# Patient Record
Sex: MALE | Race: WHITE | NOT HISPANIC OR LATINO | URBAN - METROPOLITAN AREA
[De-identification: names, ages, dates, MRNs, and addresses within clinical notes are randomized per-mention and may not be internally consistent; named-entity substitution may affect disease eponyms.]

---

## 2020-06-29 PROBLEM — M54.2 NECK PAIN: Status: ACTIVE | Noted: 2020-06-29

## 2020-11-23 ENCOUNTER — HISTORICAL (OUTPATIENT)
Dept: OCCUPATIONAL THERAPY | Facility: HOSPITAL | Age: 60
End: 2020-11-23

## 2020-12-01 ENCOUNTER — HISTORICAL (OUTPATIENT)
Dept: OCCUPATIONAL THERAPY | Facility: HOSPITAL | Age: 60
End: 2020-12-01

## 2020-12-08 ENCOUNTER — HISTORICAL (OUTPATIENT)
Dept: OCCUPATIONAL THERAPY | Facility: HOSPITAL | Age: 60
End: 2020-12-08

## 2020-12-10 ENCOUNTER — HISTORICAL (OUTPATIENT)
Dept: OCCUPATIONAL THERAPY | Facility: HOSPITAL | Age: 60
End: 2020-12-10

## 2020-12-17 ENCOUNTER — HISTORICAL (OUTPATIENT)
Dept: OCCUPATIONAL THERAPY | Facility: HOSPITAL | Age: 60
End: 2020-12-17

## 2020-12-21 ENCOUNTER — HISTORICAL (OUTPATIENT)
Dept: OCCUPATIONAL THERAPY | Facility: HOSPITAL | Age: 60
End: 2020-12-21

## 2020-12-29 ENCOUNTER — HISTORICAL (OUTPATIENT)
Dept: OCCUPATIONAL THERAPY | Facility: HOSPITAL | Age: 60
End: 2020-12-29

## 2021-01-07 ENCOUNTER — HISTORICAL (OUTPATIENT)
Dept: OCCUPATIONAL THERAPY | Facility: HOSPITAL | Age: 61
End: 2021-01-07

## 2021-01-14 ENCOUNTER — HISTORICAL (OUTPATIENT)
Dept: OCCUPATIONAL THERAPY | Facility: HOSPITAL | Age: 61
End: 2021-01-14

## 2021-01-19 ENCOUNTER — HISTORICAL (OUTPATIENT)
Dept: OCCUPATIONAL THERAPY | Facility: HOSPITAL | Age: 61
End: 2021-01-19

## 2021-01-21 ENCOUNTER — HISTORICAL (OUTPATIENT)
Dept: OCCUPATIONAL THERAPY | Facility: HOSPITAL | Age: 61
End: 2021-01-21

## 2021-01-26 ENCOUNTER — HISTORICAL (OUTPATIENT)
Dept: OCCUPATIONAL THERAPY | Facility: HOSPITAL | Age: 61
End: 2021-01-26

## 2021-01-28 ENCOUNTER — HISTORICAL (OUTPATIENT)
Dept: OCCUPATIONAL THERAPY | Facility: HOSPITAL | Age: 61
End: 2021-01-28

## 2022-10-04 ENCOUNTER — ANESTHESIA EVENT (OUTPATIENT)
Dept: ENDOSCOPY | Facility: HOSPITAL | Age: 62
End: 2022-10-04
Payer: MEDICARE

## 2022-10-05 ENCOUNTER — ANESTHESIA (OUTPATIENT)
Dept: ENDOSCOPY | Facility: HOSPITAL | Age: 62
End: 2022-10-05
Payer: MEDICARE

## 2022-10-05 ENCOUNTER — HOSPITAL ENCOUNTER (OUTPATIENT)
Facility: HOSPITAL | Age: 62
Discharge: HOME | End: 2022-10-05
Attending: INTERNAL MEDICINE | Admitting: INTERNAL MEDICINE
Payer: MEDICARE

## 2022-10-05 LAB — POCT GLUCOSE: 89 MG/DL (ref 70–110)

## 2022-10-05 PROCEDURE — 25000003 PHARM REV CODE 250: Performed by: INTERNAL MEDICINE

## 2022-10-05 PROCEDURE — 25000003 PHARM REV CODE 250: Performed by: ANESTHESIOLOGY

## 2022-10-05 PROCEDURE — 94640 AIRWAY INHALATION TREATMENT: CPT

## 2022-10-05 PROCEDURE — 63600175 PHARM REV CODE 636 W HCPCS: Performed by: ANESTHESIOLOGY

## 2022-10-05 PROCEDURE — 99900035 HC TECH TIME PER 15 MIN (STAT)

## 2022-10-05 PROCEDURE — 31652 BRONCH EBUS SAMPLNG 1/2 NODE: CPT | Performed by: INTERNAL MEDICINE

## 2022-10-05 PROCEDURE — 37000008 HC ANESTHESIA 1ST 15 MINUTES: Performed by: INTERNAL MEDICINE

## 2022-10-05 PROCEDURE — 37000009 HC ANESTHESIA EA ADD 15 MINS: Performed by: INTERNAL MEDICINE

## 2022-10-05 PROCEDURE — 88161 CYTOPATH SMEAR OTHER SOURCE: CPT | Performed by: INTERNAL MEDICINE

## 2022-10-05 PROCEDURE — 25000242 PHARM REV CODE 250 ALT 637 W/ HCPCS: Performed by: ANESTHESIOLOGY

## 2022-10-05 PROCEDURE — 27201423 OPTIME MED/SURG SUP & DEVICES STERILE SUPPLY: Performed by: INTERNAL MEDICINE

## 2022-10-05 RX ORDER — IPRATROPIUM BROMIDE AND ALBUTEROL SULFATE 2.5; .5 MG/3ML; MG/3ML
3 SOLUTION RESPIRATORY (INHALATION) ONCE
Status: COMPLETED | OUTPATIENT
Start: 2022-10-05 | End: 2022-10-05

## 2022-10-05 RX ORDER — SODIUM CHLORIDE 0.9 % (FLUSH) 0.9 %
10 SYRINGE (ML) INJECTION
Status: CANCELLED | OUTPATIENT
Start: 2022-10-05

## 2022-10-05 RX ORDER — ONDANSETRON 2 MG/ML
INJECTION INTRAMUSCULAR; INTRAVENOUS
Status: DISCONTINUED | OUTPATIENT
Start: 2022-10-05 | End: 2022-10-05

## 2022-10-05 RX ORDER — LIDOCAINE HYDROCHLORIDE 40 MG/ML
4 SOLUTION TOPICAL
Status: DISCONTINUED | OUTPATIENT
Start: 2022-10-05 | End: 2022-10-05 | Stop reason: HOSPADM

## 2022-10-05 RX ORDER — FENTANYL CITRATE 50 UG/ML
INJECTION, SOLUTION INTRAMUSCULAR; INTRAVENOUS
Status: DISCONTINUED | OUTPATIENT
Start: 2022-10-05 | End: 2022-10-05

## 2022-10-05 RX ORDER — IPRATROPIUM BROMIDE AND ALBUTEROL SULFATE 2.5; .5 MG/3ML; MG/3ML
3 SOLUTION RESPIRATORY (INHALATION) EVERY 6 HOURS PRN
COMMUNITY

## 2022-10-05 RX ORDER — LIDOCAINE HYDROCHLORIDE 20 MG/ML
15 SOLUTION OROPHARYNGEAL ONCE
Status: COMPLETED | OUTPATIENT
Start: 2022-10-05 | End: 2022-10-05

## 2022-10-05 RX ORDER — FAMOTIDINE 10 MG/ML
INJECTION INTRAVENOUS
Status: DISCONTINUED
Start: 2022-10-05 | End: 2022-10-05 | Stop reason: HOSPADM

## 2022-10-05 RX ORDER — HYDROCODONE BITARTRATE AND ACETAMINOPHEN 5; 325 MG/1; MG/1
1 TABLET ORAL
Status: CANCELLED | OUTPATIENT
Start: 2022-10-05

## 2022-10-05 RX ORDER — LIDOCAINE HYDROCHLORIDE 10 MG/ML
1 INJECTION, SOLUTION EPIDURAL; INFILTRATION; INTRACAUDAL; PERINEURAL ONCE
Status: DISCONTINUED | OUTPATIENT
Start: 2022-10-05 | End: 2022-10-05 | Stop reason: HOSPADM

## 2022-10-05 RX ORDER — GLYCOPYRROLATE 0.2 MG/ML
INJECTION INTRAMUSCULAR; INTRAVENOUS
Status: DISCONTINUED | OUTPATIENT
Start: 2022-10-05 | End: 2022-10-05

## 2022-10-05 RX ORDER — PROPOFOL 10 MG/ML
VIAL (ML) INTRAVENOUS CONTINUOUS PRN
Status: DISCONTINUED | OUTPATIENT
Start: 2022-10-05 | End: 2022-10-05

## 2022-10-05 RX ORDER — FENTANYL CITRATE 50 UG/ML
25 INJECTION, SOLUTION INTRAMUSCULAR; INTRAVENOUS EVERY 5 MIN PRN
Status: CANCELLED | OUTPATIENT
Start: 2022-10-05

## 2022-10-05 RX ORDER — LIDOCAINE HYDROCHLORIDE 20 MG/ML
15 SOLUTION OROPHARYNGEAL ONCE
Status: DISCONTINUED | OUTPATIENT
Start: 2022-10-05 | End: 2022-10-05

## 2022-10-05 RX ORDER — FAMOTIDINE 10 MG/ML
20 INJECTION INTRAVENOUS ONCE
Status: COMPLETED | OUTPATIENT
Start: 2022-10-05 | End: 2022-10-05

## 2022-10-05 RX ORDER — DIPHENHYDRAMINE HYDROCHLORIDE 50 MG/ML
12.5 INJECTION INTRAMUSCULAR; INTRAVENOUS ONCE
Status: COMPLETED | OUTPATIENT
Start: 2022-10-05 | End: 2022-10-05

## 2022-10-05 RX ORDER — PROPOFOL 10 MG/ML
VIAL (ML) INTRAVENOUS
Status: DISCONTINUED | OUTPATIENT
Start: 2022-10-05 | End: 2022-10-05

## 2022-10-05 RX ORDER — DEXAMETHASONE SODIUM PHOSPHATE 4 MG/ML
INJECTION, SOLUTION INTRA-ARTICULAR; INTRALESIONAL; INTRAMUSCULAR; INTRAVENOUS; SOFT TISSUE
Status: DISCONTINUED | OUTPATIENT
Start: 2022-10-05 | End: 2022-10-05

## 2022-10-05 RX ADMIN — PROPOFOL 150 MG: 10 INJECTION, EMULSION INTRAVENOUS at 11:10

## 2022-10-05 RX ADMIN — DEXAMETHASONE SODIUM PHOSPHATE 4 MG: 4 INJECTION, SOLUTION INTRA-ARTICULAR; INTRALESIONAL; INTRAMUSCULAR; INTRAVENOUS; SOFT TISSUE at 11:10

## 2022-10-05 RX ADMIN — DIPHENHYDRAMINE HYDROCHLORIDE 12.5 MG: 50 INJECTION INTRAMUSCULAR; INTRAVENOUS at 11:10

## 2022-10-05 RX ADMIN — SODIUM CHLORIDE, SODIUM GLUCONATE, SODIUM ACETATE, POTASSIUM CHLORIDE AND MAGNESIUM CHLORIDE: 526; 502; 368; 37; 30 INJECTION, SOLUTION INTRAVENOUS at 11:10

## 2022-10-05 RX ADMIN — FENTANYL CITRATE 50 MCG: 50 INJECTION, SOLUTION INTRAMUSCULAR; INTRAVENOUS at 11:10

## 2022-10-05 RX ADMIN — LIDOCAINE HYDROCHLORIDE 15 ML: 20 SOLUTION ORAL; TOPICAL at 11:10

## 2022-10-05 RX ADMIN — LIDOCAINE HYDROCHLORIDE 4 ML: 40 SOLUTION TOPICAL at 11:10

## 2022-10-05 RX ADMIN — Medication 100 MCG/KG/MIN: at 11:10

## 2022-10-05 RX ADMIN — ONDANSETRON 4 MG: 2 INJECTION INTRAMUSCULAR; INTRAVENOUS at 11:10

## 2022-10-05 RX ADMIN — IPRATROPIUM BROMIDE AND ALBUTEROL SULFATE 3 ML: 2.5; .5 SOLUTION RESPIRATORY (INHALATION) at 11:10

## 2022-10-05 RX ADMIN — GLYCOPYRROLATE 0.2 MG: 0.2 INJECTION INTRAMUSCULAR; INTRAVENOUS at 11:10

## 2022-10-05 RX ADMIN — FAMOTIDINE 20 MG: 10 INJECTION, SOLUTION INTRAVENOUS at 10:10

## 2022-10-05 NOTE — TRANSFER OF CARE
"Anesthesia Transfer of Care Note    Patient: Benitez Garcia    Procedure(s) Performed: Procedure(s) (LRB):  ENDOBRONCHIAL ULTRASOUND (EBUS) (N/A)    Patient location: OPS    Anesthesia Type: general    Transport from OR: Transported from OR on room air with adequate spontaneous ventilation    Post pain: adequate analgesia    Post assessment: no apparent anesthetic complications and tolerated procedure well    Post vital signs: stable    Level of consciousness: awake, alert and oriented    Nausea/Vomiting: no nausea/vomiting    Complications: none    Transfer of care protocol was followed      Last vitals:   Visit Vitals  /76   Pulse 75   Temp 36 °C (96.8 °F)   Resp 16   Ht 5' 6" (1.676 m)   Wt 108.4 kg (239 lb)   SpO2 96%   BMI 38.58 kg/m²     "

## 2022-10-05 NOTE — H&P
HPI  The patient is a 62-year-old male with a past medical history of hyperlipidemia, diabetes mellitus, GREGORIO on CPAP, GERD, tobacco abuse who presents to the clinic for referral for mediastinal adenopathy patient initially had CT chest per outside records at at [facility] for lung cancer screening purposes on May 17, 2022 with comparison to prior CT chest in January of 2022 demonstrating borderline bilateral axillary lymphadenopathy with progressive mediastinal adenopathy with an azygous node measuring 3.2 cm previously seen at 2.7 cm, paratracheal node measuring 3.1 cm compared to 2.3 cm with increased prominence of hilar nodes with continued centrilobular and paraseptal emphysema with some interstitial fibrosis with multifocal nodular appearing pleural thickening with increase in the size of a right upper lobe nodule now measuring 5 mm compared to 3 mm with numerous additional bilateral pulmonary nodules including nodules in the right upper lobe and right middle lobe as well as the left upper and lower lobes.  A PET-CT was recommended which was performed on 05/30 with similar adenopathy seen with a right paratracheal lymph node measuring 3.4 x 1.9 cm previously 2.9 x 1.8 cm with SUV of 4.7 with another right paratracheal lymph node measuring 1.9 x 1.9 cm previously 1.6 x 1.5 cm with SUV uptake of 4.3.  AP window lymph node measuring 1.7 x 1.5 cm was previously 1.8 x 1.2 cm with SUV uptake of 2.5. An azygoesophageal lymph node measuring 2.1 x 1.5 cm previously measured 2.8 x 1.2 cm with SUV uptake of 3.1.  The right axillary lymph node measured 17 x 10 mm with peak SUV uptake of 2.3 with left axillary lymph node measuring 1.6 x 1.4 cm previously 1.6 x 1.4 with unchanged SUV of 2.3.  The patient was referred to us for evaluation of this mediastinal adenopathy.     Patient continues to smoke now down to 1/2 pack per day but at the most was smoking 1.5 packs per day since the age of 15.  He denies any alcohol  usage or illicit drug use.  Denies any chewing tobacco or usage of electronic cigarettes.  The patient has been a  for roughly 35 years and states that he has exposure to multiple particulate substances.     On further questioning the patient states that over year ago he has had biopsies of his adenopathy with findings of probable necrosis without positive pathologic findings?               Past Medical History:   Diagnosis Date    COPD (chronic obstructive pulmonary disease)      Diabetes mellitus                    Past Surgical History:   Procedure Laterality Date    FRACTURE SURGERY        fusion left wrist        plate left ankle        SPINE SURGERY                      Family History   Problem Relation Age of Onset    Hearing loss Mother      Cancer Brother              Social History               Socioeconomic History    Marital status:    Tobacco Use    Smoking status: Every Day       Packs/day: 0.25       Years: 48.00       Pack years: 12.00       Types: Cigarettes    Smokeless tobacco: Never   Substance and Sexual Activity    Alcohol use: Not Currently    Drug use: Not Currently                     Review of patient's allergies indicates:   Allergen Reactions    Sulfamethoxazole-trimethoprim Hives       And itching    Darvocet a500 [propoxyphene n-acetaminophen]      Darvon [propoxyphene]      Lisinopril Other (See Comments)       Patient reports BP dropped to 84/42    Propoxyphene-asa-caffeine Other (See Comments)       Patient does not remember reaction    Tylox [oxycodone-acetaminophen]                     Current Outpatient Medications on File Prior to Visit   Medication Sig Dispense Refill    albuterol (PROVENTIL/VENTOLIN HFA) 90 mcg/actuation inhaler INHALE 2 PUFFS BY MOUTH EVERY 6 HOURS AS NEEDED FOR SHORTNESS OF BREATH        amitriptyline (ELAVIL) 25 MG tablet Take 25 mg by mouth every evening.        baclofen (LIORESAL) 20 MG tablet Take 20 mg by mouth 2 (two) times daily.         "DULoxetine (CYMBALTA) 60 MG capsule          HYDROcodone-acetaminophen (NORCO)  mg per tablet Take 1 tablet by mouth every 6 to 8 hours as needed.        ibuprofen (ADVIL,MOTRIN) 800 MG tablet          omeprazole (PRILOSEC) 40 MG capsule Take 40 mg by mouth once daily.        metFORMIN (GLUCOPHAGE-XR) 500 MG XR 24hr tablet          pravastatin (PRAVACHOL) 40 MG tablet            No current facility-administered medications on file prior to visit.            Review of Systems   Constitutional:  Negative for chills, diaphoresis, fever, malaise/fatigue and weight loss.   HENT:  Negative for congestion, ear discharge, ear pain, hearing loss, nosebleeds and sore throat.    Eyes:  Negative for blurred vision, double vision, pain, discharge and redness.   Respiratory:  Negative for cough, hemoptysis, sputum production, shortness of breath, wheezing and stridor.    Cardiovascular:  Negative for chest pain, palpitations, orthopnea, claudication, leg swelling and PND.   Gastrointestinal:  Negative for abdominal pain, blood in stool, constipation, diarrhea, heartburn, melena, nausea and vomiting.   Genitourinary:  Negative for dysuria and hematuria.   Musculoskeletal:  Negative for back pain, joint pain, myalgias and neck pain.   Skin:  Negative for itching and rash.   Neurological:  Negative for dizziness, focal weakness, weakness and headaches.   Endo/Heme/Allergies:  Does not bruise/bleed easily.         There were no vitals filed for this visit.    Vitals:    10/05/22 0959   Weight: 108.4 kg (239 lb)   Height: 5' 6" (1.676 m)              Physical Exam  Vitals and nursing note reviewed.   Constitutional:       General: He is not in acute distress.     Appearance: Normal appearance. He is obese. He is not ill-appearing or toxic-appearing.   HENT:      Head: Normocephalic and atraumatic.      Right Ear: External ear normal.      Left Ear: External ear normal.      Nose: Nose normal.      Mouth/Throat:      Mouth: " Mucous membranes are moist.      Pharynx: Oropharynx is clear. No oropharyngeal exudate or posterior oropharyngeal erythema.      Comments: Poor dentition   Eyes:      General: No scleral icterus.     Extraocular Movements: Extraocular movements intact.      Conjunctiva/sclera: Conjunctivae normal.      Pupils: Pupils are equal, round, and reactive to light.   Neck:      Vascular: No carotid bruit.   Cardiovascular:      Rate and Rhythm: Normal rate and regular rhythm.      Pulses: Normal pulses.      Heart sounds: Normal heart sounds. No murmur heard.    No friction rub. No gallop.   Pulmonary:      Effort: Pulmonary effort is normal. No respiratory distress.      Breath sounds: Normal breath sounds. No wheezing, rhonchi or rales.   Abdominal:      General: Abdomen is flat. Bowel sounds are normal. There is no distension.      Palpations: Abdomen is soft.      Tenderness: There is no abdominal tenderness. There is no guarding or rebound.   Genitourinary:     Comments: deferred  Musculoskeletal:         General: No swelling or deformity. Normal range of motion.      Cervical back: Normal range of motion and neck supple. No rigidity or tenderness.   Lymphadenopathy:      Cervical: No cervical adenopathy.   Skin:     General: Skin is warm and dry.      Capillary Refill: Capillary refill takes less than 2 seconds.      Coloration: Skin is not jaundiced.      Findings: No bruising, lesion or rash.   Neurological:      General: No focal deficit present.      Mental Status: He is alert.      Sensory: No sensory deficit.      Motor: No weakness.   Psychiatric:         Mood and Affect: Mood normal.            Assessment/Plan     Problem List Items Addressed This Visit    None  Visit Diagnoses         Mediastinal adenopathy    -  Primary     Tobacco abuse                 -plan for endobronchial ultrasound today 10/5     Chino David MD  Pulmonology - [facility]

## 2022-10-05 NOTE — ANESTHESIA PREPROCEDURE EVALUATION
10/05/2022  Benitez Garcia is a 62 y.o., male.      Pre-op Assessment    I have reviewed the Patient Summary Reports.     I have reviewed the Nursing Notes. I have reviewed the NPO Status.   I have reviewed the Medications.     Review of Systems  Anesthesia Hx:  No problems with previous Anesthesia    Social:  Smoker    Cardiovascular:   Denies MI.    Denies Angina.  Denies CHF.    Pulmonary:   COPD, mild Denies Asthma.    Renal/:   Denies Chronic Renal Disease. no renal calculi     Hepatic/GI:   GERD, well controlled Denies Liver Disease. Denies Hepatitis.    Neurological:   Denies CVA. Denies Seizures.    Endocrine:   Denies Diabetes. Denies Hypothyroidism. Denies Hyperthyroidism.  Denies Obesity / BMI > 30      Physical Exam  General: Well nourished, Cooperative, Alert and Oriented    Airway:  Mallampati: II / I  Mouth Opening: Normal  TM Distance: Normal  Tongue: Large    Dental:  Dentures        Anesthesia Plan  Type of Anesthesia, risks & benefits discussed:    Anesthesia Type: Gen Supraglottic Airway  Intra-op Monitoring Plan: Standard ASA Monitors  Induction:  IV  Informed Consent: Informed consent signed with the Patient and all parties understand the risks and agree with anesthesia plan.  All questions answered.   ASA Score: 3  Day of Surgery Review of History & Physical: H&P Update referred to the surgeon/provider.    Ready For Surgery From Anesthesia Perspective.     .

## 2022-10-05 NOTE — PROCEDURES
[facility]  Bronchoscopy   Procedure Note    SUMMARY     Date of Procedure: 10/5/2022    Procedure: Endobronchial biopsy    Performed by: Chino David MD    Pre-Procedure Diagnosis: mediastinal adenopathy    Post-Procedure Diagnosis: same    Anesthesia: General Anesthesia        Description of the Findings of the Procedure:     Patient was consented for the procedure with all risk and benefit of the procedure explained in detail.  Patient was given the opportunity to ask questions and all concerns were answered.  The bronchocope was inserted into the main airway via the LMA. An anatomical survey was done of the main airways and the subsegmental bronchus.  The true and false vocal cords were visualized to be anatomically normal without any signs of mucosal abnormalities.  Bronchoscope was introduced into the trachea and both proximal and distal trachea were visualized to be widely patent without any signs of mucosal abnormalities or obstructions.  The corie was visualized to be sharp without any signs of mucosal abnormalities or obstructions.  Left and right mainstem bronchi were visualized to be widely patent without any signs of mucosal abnormalities or obstructions.  Complete survey of the right lung including the right upper right middle bronchus intermedius and right lower lobe were all visualized to be widely patent without any signs of mucosal abnormalities or obstructions.  Complete survey of the left lung including left upper lobe lingula and left lower lobe was performed demonstrating widely patent airways without any signs of mucosal abnormalities or obstructions.  The bronchoscope was then withdrawn from the airway and the endobronchial ultrasound scope was introduced into the airway via LMA.  The scope was then placed at station 4R with visualization of a lymph node.  A total of 3 passes was performed at this lymph node station.  Station 4 L 7 L and 7 R were visualized without any  clear signs of a large enough lymph node that did not appear necrotic versus did not involve vasculature, thus, no passes were made at the stations.  Hemostasis was confirmed with visualization.  The scope was withdrawn.  Patient tolerated procedure well.    Complications: None; patient tolerated the procedure well.    Estimated Blood Loss (EBL): Minimal           Specimens: Sent        Condition: Good        Disposition:  Patient transported back to room.  Disposition to home, will follow up with results as an outpatient.  Okay to advance diet and physical activity as tolerated.    Chino David MD  [facility]

## 2022-10-05 NOTE — ANESTHESIA PROCEDURE NOTES
Intubation    Date/Time: 10/5/2022 11:31 AM  Performed by: Elo Lechuga  Authorized by: John Plascencia DO     Intubation:     Induction:  Intravenous    Intubated:  Postinduction    Attempts:  1    Attempted By:  CRNA    Difficult Airway Encountered?: No      Airway Device:  Supraglottic airway/LMA    Style/Cuff Inflation:  Uncuffed    Secured at:  The lips    Placement Verified By:  Capnometry    Complicating Factors:  None    Findings Post-Intubation:  BS equal bilateral and atraumatic/condition of teeth unchanged

## 2022-10-05 NOTE — DISCHARGE INSTRUCTIONS
NO driving and NO alcohol consumption for 24 hours and while taking narcotic pain medication.    - Mild cough and/or sore throat is expected. Red streaks may be present in mucus. Hard candies, peppermints, throat lozenges, gargling with warm water and salt may help    - Although no incisions were made monitor for signs of infection such as fever or chills.    - Report to your nearest ER and/or call your doctor if you experience any SUDDEN/SEVERE chest/abdominal pain, weakness, trouble breathing, uncontrolled pain, coughing up more than 8oz of blood.

## 2022-10-05 NOTE — ANESTHESIA POSTPROCEDURE EVALUATION
Anesthesia Post Evaluation    Patient: Benitez Garcia    Procedure(s) Performed: Procedure(s) (LRB):  ENDOBRONCHIAL ULTRASOUND (EBUS) (N/A)    Final Anesthesia Type: MAC      Patient location during evaluation: PACU  Patient participation: Yes- Able to Participate  Level of consciousness: awake and alert  Post-procedure vital signs: reviewed and stable  Pain management: adequate  Airway patency: patent    PONV status at discharge: No PONV  Anesthetic complications: no      Cardiovascular status: blood pressure returned to baseline  Respiratory status: spontaneous ventilation and unassisted  Hydration status: euvolemic  Follow-up needed           Vitals Value Taken Time         No case tracking events are documented in the log.      Pain/Erlinda Score: Erlinda Score: 10 (10/5/2022  1:40 PM)

## 2022-10-06 VITALS
HEIGHT: 66 IN | DIASTOLIC BLOOD PRESSURE: 78 MMHG | BODY MASS INDEX: 38.41 KG/M2 | HEART RATE: 56 BPM | WEIGHT: 239 LBS | RESPIRATION RATE: 16 BRPM | OXYGEN SATURATION: 92 % | SYSTOLIC BLOOD PRESSURE: 125 MMHG | TEMPERATURE: 97 F

## 2022-10-08 LAB
ESTROGEN SERPL-MCNC: NORMAL PG/ML
INSULIN SERPL-ACNC: NORMAL U[IU]/ML
LAB AP CLINICAL INFORMATION: NORMAL
LAB AP GROSS DESCRIPTION: NORMAL
LAB AP REPORT FOOTNOTES: NORMAL
T3RU NFR SERPL: NORMAL %

## 2022-10-11 ENCOUNTER — DOCUMENTATION ONLY (OUTPATIENT)
Dept: PULMONOLOGY | Facility: CLINIC | Age: 62
End: 2022-10-11
Payer: MEDICARE

## 2022-10-11 NOTE — PROGRESS NOTES
Attempted to call the patient on his cell phone and his home number was unable to reach to give his results for endobronchial ultrasound which demonstrated benign pathology.  Plans are to inform the patient and then referred to CV surgery to determine if further surgical biopsy is warranted for diagnosis.

## 2022-10-12 ENCOUNTER — DOCUMENTATION ONLY (OUTPATIENT)
Dept: PULMONOLOGY | Facility: CLINIC | Age: 62
End: 2022-10-12
Payer: MEDICARE

## 2022-10-12 NOTE — PROGRESS NOTES
Patient informed of benign pathology from lymph node station 4R biopsy.  Plan to refer to CV surgery.  All questions answered.  Patient understands and accepts the plan.

## 2022-11-15 ENCOUNTER — OFFICE VISIT (OUTPATIENT)
Dept: CARDIAC SURGERY | Facility: CLINIC | Age: 62
End: 2022-11-15
Payer: MEDICARE

## 2022-11-15 VITALS
SYSTOLIC BLOOD PRESSURE: 135 MMHG | HEART RATE: 74 BPM | BODY MASS INDEX: 38.57 KG/M2 | HEIGHT: 66 IN | DIASTOLIC BLOOD PRESSURE: 74 MMHG | WEIGHT: 240 LBS

## 2022-11-15 DIAGNOSIS — R59.0 MEDIASTINAL ADENOPATHY: Primary | ICD-10-CM

## 2022-11-15 DIAGNOSIS — Z79.01 ANTICOAGULANT LONG-TERM USE: ICD-10-CM

## 2022-11-15 DIAGNOSIS — R59.0 MEDIASTINAL ADENOPATHY: ICD-10-CM

## 2022-11-15 PROCEDURE — 99203 PR OFFICE/OUTPT VISIT, NEW, LEVL III, 30-44 MIN: ICD-10-PCS | Mod: ,,, | Performed by: SPECIALIST

## 2022-11-15 PROCEDURE — 99203 OFFICE O/P NEW LOW 30 MIN: CPT | Mod: ,,, | Performed by: SPECIALIST

## 2022-11-15 RX ORDER — OXYCODONE AND ACETAMINOPHEN 10; 325 MG/1; MG/1
1 TABLET ORAL 3 TIMES DAILY PRN
Status: ON HOLD | COMMUNITY
Start: 2022-11-08 | End: 2022-12-07 | Stop reason: HOSPADM

## 2022-11-15 NOTE — H&P (VIEW-ONLY)
[facility]  History & Physical  Cardiothoracic Surgery    SUBJECTIVE:     Chief Complaint/Reason for Visit:   Chief Complaint   Patient presents with    Pre-op Exam      RE: Lung Mass        History of Present Illness:  Patient is a 62 y.o. male presents referred by Dr. Archuleta for evaluation of mediastinal adenopathy.  He has a history of COPD and has been having yearly CT scans for awhile.  They now have revealed enlarging mediastinal adenopathy.  He  has had an EBUS that was nondiagnostic.  PET scan reveals hypermetabolic enlarged right paratracheal lymph nodes in addition to some in the axilla that I am unable to palpate.  His CT scan reveals multiple enlarged lymph nodes in the mediastinum.  This should be relatively easily approachable by a mediastinoscopy.  I have reviewed the films in detail with the patient and his son-in-law.  In addition we discussed the risks and benefits of surgery in great detail.  They understand and would like to proceed.  He is just had back surgery last week.  We will give him a few more weeks to recover from this and then plan for surgery at that time.    Review of patient's allergies indicates:   Allergen Reactions    Oxycodone-acetaminophen Hives    Propoxyphene n-acetaminophen Hives    Sulfamethoxazole-trimethoprim Hives     And itching    Darvon [propoxyphene]     Lisinopril Other (See Comments)     Patient reports BP dropped to 84/42    Propoxyphene-asa-caffeine Other (See Comments)     Patient does not remember reaction  Itching       Past Medical History:   Diagnosis Date    COPD (chronic obstructive pulmonary disease)      Past Surgical History:   Procedure Laterality Date    DECOMPRESSION OF CERVICAL SPINE BY POSTERIOR APPROACH      ENDOBRONCHIAL ULTRASOUND N/A 10/05/2022    Procedure: ENDOBRONCHIAL ULTRASOUND (EBUS);  Surgeon: Chino David MD;  Location: [facility];  Service: Pulmonary;  Laterality: N/A;     FRACTURE SURGERY      fusion left wrist      plate left ankle      SPINE SURGERY       Family History   Problem Relation Age of Onset    Hearing loss Mother     Cancer Brother      Social History     Tobacco Use    Smoking status: Every Day     Packs/day: 0.25     Years: 48.00     Pack years: 12.00     Types: Cigarettes    Smokeless tobacco: Never   Substance Use Topics    Alcohol use: Not Currently    Drug use: Not Currently        Review of Systems:  Review of Systems   Constitutional: Negative.   HENT: Negative.     Eyes: Negative.    Cardiovascular: Negative.    Respiratory: Negative.     Endocrine: Negative.    Hematologic/Lymphatic: Negative.    Skin: Negative.    Musculoskeletal: Negative.    Gastrointestinal: Negative.    Genitourinary: Negative.    Neurological: Negative.    Psychiatric/Behavioral: Negative.     Allergic/Immunologic: Negative.      OBJECTIVE:     Vital Signs (Most Recent):  Pulse: 74 (11/15/22 1012)  BP: 135/74 (11/15/22 1012)    Admission: Weight: 108.9 kg (240 lb) (11/15/22 1012)   Most Recent: Weight: 108.9 kg (240 lb) (11/15/22 1012)    Physical Exam:  Physical Exam  Vitals reviewed.   Constitutional:       Appearance: He is obese.   HENT:      Head: Normocephalic and atraumatic.   Eyes:      Pupils: Pupils are equal, round, and reactive to light.   Cardiovascular:      Rate and Rhythm: Normal rate and regular rhythm.      Pulses: Normal pulses.      Heart sounds: Normal heart sounds.   Pulmonary:      Effort: Pulmonary effort is normal.      Breath sounds: Normal breath sounds.   Abdominal:      Palpations: Abdomen is soft.   Musculoskeletal:         General: Normal range of motion.      Cervical back: Normal range of motion.   Skin:     General: Skin is warm.   Neurological:      General: No focal deficit present.      Mental Status: He is alert and oriented to person, place, and time.   Psychiatric:         Mood and Affect: Mood normal.         Behavior: Behavior normal.        Diagnostic Results:  CT: Reviewed      ASSESSMENT/PLAN:     1. Mediastinal adenopathy    COPD   Hypertension   Moderate obesity   Lumbar sacral disc disease  Planning mediastinoscopy with biopsy of multiple right paratracheal lymph nodes

## 2022-11-15 NOTE — PROGRESS NOTES
[facility]  History & Physical  Cardiothoracic Surgery    SUBJECTIVE:     Chief Complaint/Reason for Visit:   Chief Complaint   Patient presents with    Pre-op Exam      RE: Lung Mass        History of Present Illness:  Patient is a 62 y.o. male presents referred by Dr. Archuleta for evaluation of mediastinal adenopathy.  He has a history of COPD and has been having yearly CT scans for awhile.  They now have revealed enlarging mediastinal adenopathy.  He  has had an EBUS that was nondiagnostic.  PET scan reveals hypermetabolic enlarged right paratracheal lymph nodes in addition to some in the axilla that I am unable to palpate.  His CT scan reveals multiple enlarged lymph nodes in the mediastinum.  This should be relatively easily approachable by a mediastinoscopy.  I have reviewed the films in detail with the patient and his son-in-law.  In addition we discussed the risks and benefits of surgery in great detail.  They understand and would like to proceed.  He is just had back surgery last week.  We will give him a few more weeks to recover from this and then plan for surgery at that time.    Review of patient's allergies indicates:   Allergen Reactions    Oxycodone-acetaminophen Hives    Propoxyphene n-acetaminophen Hives    Sulfamethoxazole-trimethoprim Hives     And itching    Darvon [propoxyphene]     Lisinopril Other (See Comments)     Patient reports BP dropped to 84/42    Propoxyphene-asa-caffeine Other (See Comments)     Patient does not remember reaction  Itching       Past Medical History:   Diagnosis Date    COPD (chronic obstructive pulmonary disease)      Past Surgical History:   Procedure Laterality Date    DECOMPRESSION OF CERVICAL SPINE BY POSTERIOR APPROACH      ENDOBRONCHIAL ULTRASOUND N/A 10/05/2022    Procedure: ENDOBRONCHIAL ULTRASOUND (EBUS);  Surgeon: Chino David MD;  Location: [facility];  Service: Pulmonary;  Laterality: N/A;     FRACTURE SURGERY      fusion left wrist      plate left ankle      SPINE SURGERY       Family History   Problem Relation Age of Onset    Hearing loss Mother     Cancer Brother      Social History     Tobacco Use    Smoking status: Every Day     Packs/day: 0.25     Years: 48.00     Pack years: 12.00     Types: Cigarettes    Smokeless tobacco: Never   Substance Use Topics    Alcohol use: Not Currently    Drug use: Not Currently        Review of Systems:  Review of Systems   Constitutional: Negative.   HENT: Negative.     Eyes: Negative.    Cardiovascular: Negative.    Respiratory: Negative.     Endocrine: Negative.    Hematologic/Lymphatic: Negative.    Skin: Negative.    Musculoskeletal: Negative.    Gastrointestinal: Negative.    Genitourinary: Negative.    Neurological: Negative.    Psychiatric/Behavioral: Negative.     Allergic/Immunologic: Negative.      OBJECTIVE:     Vital Signs (Most Recent):  Pulse: 74 (11/15/22 1012)  BP: 135/74 (11/15/22 1012)    Admission: Weight: 108.9 kg (240 lb) (11/15/22 1012)   Most Recent: Weight: 108.9 kg (240 lb) (11/15/22 1012)    Physical Exam:  Physical Exam  Vitals reviewed.   Constitutional:       Appearance: He is obese.   HENT:      Head: Normocephalic and atraumatic.   Eyes:      Pupils: Pupils are equal, round, and reactive to light.   Cardiovascular:      Rate and Rhythm: Normal rate and regular rhythm.      Pulses: Normal pulses.      Heart sounds: Normal heart sounds.   Pulmonary:      Effort: Pulmonary effort is normal.      Breath sounds: Normal breath sounds.   Abdominal:      Palpations: Abdomen is soft.   Musculoskeletal:         General: Normal range of motion.      Cervical back: Normal range of motion.   Skin:     General: Skin is warm.   Neurological:      General: No focal deficit present.      Mental Status: He is alert and oriented to person, place, and time.   Psychiatric:         Mood and Affect: Mood normal.         Behavior: Behavior normal.        Diagnostic Results:  CT: Reviewed      ASSESSMENT/PLAN:     1. Mediastinal adenopathy    COPD   Hypertension   Moderate obesity   Lumbar sacral disc disease  Planning mediastinoscopy with biopsy of multiple right paratracheal lymph nodes

## 2022-11-28 RX ORDER — ERGOCALCIFEROL 1.25 MG/1
50000 CAPSULE ORAL
COMMUNITY

## 2022-11-28 RX ORDER — WITCH HAZEL 50 %
2500 PADS, MEDICATED (EA) TOPICAL DAILY
COMMUNITY

## 2022-12-02 ENCOUNTER — HOSPITAL ENCOUNTER (OUTPATIENT)
Dept: RADIOLOGY | Facility: HOSPITAL | Age: 62
Discharge: HOME | End: 2022-12-02
Attending: SPECIALIST
Payer: MEDICARE

## 2022-12-02 DIAGNOSIS — R59.0 MEDIASTINAL ADENOPATHY: ICD-10-CM

## 2022-12-02 PROCEDURE — 71046 X-RAY EXAM CHEST 2 VIEWS: CPT | Mod: TC

## 2022-12-05 ENCOUNTER — ANESTHESIA EVENT (OUTPATIENT)
Dept: SURGERY | Facility: HOSPITAL | Age: 62
End: 2022-12-05
Payer: MEDICARE

## 2022-12-07 ENCOUNTER — ANESTHESIA (OUTPATIENT)
Dept: SURGERY | Facility: HOSPITAL | Age: 62
End: 2022-12-07
Payer: MEDICARE

## 2022-12-07 ENCOUNTER — HOSPITAL ENCOUNTER (OUTPATIENT)
Facility: HOSPITAL | Age: 62
Discharge: HOME | End: 2022-12-07
Attending: SPECIALIST | Admitting: SPECIALIST
Payer: MEDICARE

## 2022-12-07 DIAGNOSIS — R59.1 LYMPHADENOPATHY: ICD-10-CM

## 2022-12-07 DIAGNOSIS — Z79.01 ANTICOAGULANT LONG-TERM USE: ICD-10-CM

## 2022-12-07 DIAGNOSIS — R59.0 MEDIASTINAL ADENOPATHY: Primary | ICD-10-CM

## 2022-12-07 LAB
BASOPHILS # BLD AUTO: 0.16 X10(3)/MCL (ref 0–0.2)
BASOPHILS NFR BLD AUTO: 1.5 %
EOSINOPHIL # BLD AUTO: 0.53 X10(3)/MCL (ref 0–0.9)
EOSINOPHIL NFR BLD AUTO: 4.9 %
ERYTHROCYTE [DISTWIDTH] IN BLOOD BY AUTOMATED COUNT: 13.3 % (ref 11.5–17)
HCT VFR BLD AUTO: 42.6 % (ref 42–52)
HGB BLD-MCNC: 13.7 GM/DL (ref 14–18)
IMM GRANULOCYTES # BLD AUTO: 0.27 X10(3)/MCL (ref 0–0.04)
IMM GRANULOCYTES NFR BLD AUTO: 2.5 %
LYMPHOCYTES # BLD AUTO: 4 X10(3)/MCL (ref 0.6–4.6)
LYMPHOCYTES NFR BLD AUTO: 36.7 %
MCH RBC QN AUTO: 30.2 PG (ref 27–31)
MCHC RBC AUTO-ENTMCNC: 32.2 MG/DL (ref 33–36)
MCV RBC AUTO: 93.8 FL (ref 80–94)
MONOCYTES # BLD AUTO: 0.87 X10(3)/MCL (ref 0.1–1.3)
MONOCYTES NFR BLD AUTO: 8 %
NEUTROPHILS # BLD AUTO: 5.1 X10(3)/MCL (ref 2.1–9.2)
NEUTROPHILS NFR BLD AUTO: 46.4 %
NRBC BLD AUTO-RTO: 0 %
PLATELET # BLD AUTO: 231 X10(3)/MCL (ref 130–400)
PMV BLD AUTO: 11.2 FL (ref 7.4–10.4)
RBC # BLD AUTO: 4.54 X10(6)/MCL (ref 4.7–6.1)
WBC # SPEC AUTO: 10.9 X10(3)/MCL (ref 4.5–11.5)

## 2022-12-07 PROCEDURE — 87206 SMEAR FLUORESCENT/ACID STAI: CPT | Performed by: SPECIALIST

## 2022-12-07 PROCEDURE — 94640 AIRWAY INHALATION TREATMENT: CPT

## 2022-12-07 PROCEDURE — 25000003 PHARM REV CODE 250: Performed by: SPECIALIST

## 2022-12-07 PROCEDURE — 25000003 PHARM REV CODE 250

## 2022-12-07 PROCEDURE — 36000711: Performed by: SPECIALIST

## 2022-12-07 PROCEDURE — 71000015 HC POSTOP RECOV 1ST HR: Performed by: SPECIALIST

## 2022-12-07 PROCEDURE — 37000009 HC ANESTHESIA EA ADD 15 MINS: Performed by: SPECIALIST

## 2022-12-07 PROCEDURE — 63600175 PHARM REV CODE 636 W HCPCS

## 2022-12-07 PROCEDURE — 39402 MEDIASTINOSCPY W/LMPH NOD BX: CPT | Mod: ,,, | Performed by: SPECIALIST

## 2022-12-07 PROCEDURE — 88305 TISSUE EXAM BY PATHOLOGIST: CPT | Performed by: SPECIALIST

## 2022-12-07 PROCEDURE — 87070 CULTURE OTHR SPECIMN AEROBIC: CPT | Performed by: SPECIALIST

## 2022-12-07 PROCEDURE — 88184 FLOWCYTOMETRY/ TC 1 MARKER: CPT

## 2022-12-07 PROCEDURE — 39402 PR MEDIASTINOSCPY W/LMPH NOD BX: ICD-10-PCS | Mod: ,,, | Performed by: SPECIALIST

## 2022-12-07 PROCEDURE — 25000242 PHARM REV CODE 250 ALT 637 W/ HCPCS: Performed by: ANESTHESIOLOGY

## 2022-12-07 PROCEDURE — 63600175 PHARM REV CODE 636 W HCPCS: Performed by: SPECIALIST

## 2022-12-07 PROCEDURE — 88188 FLOWCYTOMETRY/READ 9-15: CPT

## 2022-12-07 PROCEDURE — 71000033 HC RECOVERY, INTIAL HOUR: Performed by: SPECIALIST

## 2022-12-07 PROCEDURE — 88185 FLOWCYTOMETRY/TC ADD-ON: CPT

## 2022-12-07 PROCEDURE — 85025 COMPLETE CBC W/AUTO DIFF WBC: CPT

## 2022-12-07 PROCEDURE — 87102 FUNGUS ISOLATION CULTURE: CPT | Performed by: SPECIALIST

## 2022-12-07 PROCEDURE — 37000008 HC ANESTHESIA 1ST 15 MINUTES: Performed by: SPECIALIST

## 2022-12-07 PROCEDURE — 94760 N-INVAS EAR/PLS OXIMETRY 1: CPT

## 2022-12-07 PROCEDURE — 36000710: Performed by: SPECIALIST

## 2022-12-07 PROCEDURE — 87075 CULTR BACTERIA EXCEPT BLOOD: CPT | Performed by: SPECIALIST

## 2022-12-07 PROCEDURE — 27201423 OPTIME MED/SURG SUP & DEVICES STERILE SUPPLY: Performed by: SPECIALIST

## 2022-12-07 PROCEDURE — 71000016 HC POSTOP RECOV ADDL HR: Performed by: SPECIALIST

## 2022-12-07 RX ORDER — IPRATROPIUM BROMIDE AND ALBUTEROL SULFATE 2.5; .5 MG/3ML; MG/3ML
3 SOLUTION RESPIRATORY (INHALATION) ONCE AS NEEDED
Status: DISCONTINUED | OUTPATIENT
Start: 2022-12-07 | End: 2022-12-07 | Stop reason: HOSPADM

## 2022-12-07 RX ORDER — PROPOFOL 10 MG/ML
VIAL (ML) INTRAVENOUS
Status: DISCONTINUED | OUTPATIENT
Start: 2022-12-07 | End: 2022-12-07

## 2022-12-07 RX ORDER — METHOCARBAMOL 100 MG/ML
INJECTION, SOLUTION INTRAMUSCULAR; INTRAVENOUS
Status: COMPLETED
Start: 2022-12-07 | End: 2022-12-07

## 2022-12-07 RX ORDER — TRAMADOL HYDROCHLORIDE 50 MG/1
50 TABLET ORAL EVERY 4 HOURS PRN
Status: DISCONTINUED | OUTPATIENT
Start: 2022-12-07 | End: 2022-12-07 | Stop reason: HOSPADM

## 2022-12-07 RX ORDER — VASOPRESSIN 20 [USP'U]/ML
INJECTION, SOLUTION INTRAMUSCULAR; SUBCUTANEOUS
Status: DISCONTINUED | OUTPATIENT
Start: 2022-12-07 | End: 2022-12-07

## 2022-12-07 RX ORDER — EPHEDRINE SULFATE 50 MG/ML
INJECTION, SOLUTION INTRAVENOUS
Status: DISCONTINUED | OUTPATIENT
Start: 2022-12-07 | End: 2022-12-07

## 2022-12-07 RX ORDER — DEXAMETHASONE SODIUM PHOSPHATE 4 MG/ML
INJECTION, SOLUTION INTRA-ARTICULAR; INTRALESIONAL; INTRAMUSCULAR; INTRAVENOUS; SOFT TISSUE
Status: DISCONTINUED | OUTPATIENT
Start: 2022-12-07 | End: 2022-12-07

## 2022-12-07 RX ORDER — SODIUM CHLORIDE 9 MG/ML
INJECTION, SOLUTION INTRAVENOUS CONTINUOUS
Status: DISCONTINUED | OUTPATIENT
Start: 2022-12-07 | End: 2022-12-07 | Stop reason: HOSPADM

## 2022-12-07 RX ORDER — KETOROLAC TROMETHAMINE 30 MG/ML
15 INJECTION, SOLUTION INTRAMUSCULAR; INTRAVENOUS ONCE
Status: DISCONTINUED | OUTPATIENT
Start: 2022-12-07 | End: 2022-12-07 | Stop reason: HOSPADM

## 2022-12-07 RX ORDER — FENTANYL CITRATE 50 UG/ML
INJECTION, SOLUTION INTRAMUSCULAR; INTRAVENOUS
Status: DISCONTINUED | OUTPATIENT
Start: 2022-12-07 | End: 2022-12-07

## 2022-12-07 RX ORDER — FAMOTIDINE 10 MG/ML
20 INJECTION INTRAVENOUS ONCE
Status: COMPLETED | OUTPATIENT
Start: 2022-12-07 | End: 2022-12-07

## 2022-12-07 RX ORDER — HYDROMORPHONE HYDROCHLORIDE 2 MG/ML
0.5 INJECTION, SOLUTION INTRAMUSCULAR; INTRAVENOUS; SUBCUTANEOUS EVERY 5 MIN PRN
Status: DISCONTINUED | OUTPATIENT
Start: 2022-12-07 | End: 2022-12-07 | Stop reason: HOSPADM

## 2022-12-07 RX ORDER — DIPHENHYDRAMINE HYDROCHLORIDE 50 MG/ML
25 INJECTION INTRAMUSCULAR; INTRAVENOUS ONCE AS NEEDED
Status: DISCONTINUED | OUTPATIENT
Start: 2022-12-07 | End: 2022-12-07 | Stop reason: HOSPADM

## 2022-12-07 RX ORDER — LIDOCAINE HYDROCHLORIDE 10 MG/ML
1 INJECTION, SOLUTION EPIDURAL; INFILTRATION; INTRACAUDAL; PERINEURAL ONCE
Status: DISCONTINUED | OUTPATIENT
Start: 2022-12-07 | End: 2022-12-07 | Stop reason: HOSPADM

## 2022-12-07 RX ORDER — IPRATROPIUM BROMIDE AND ALBUTEROL SULFATE 2.5; .5 MG/3ML; MG/3ML
3 SOLUTION RESPIRATORY (INHALATION) ONCE
Status: COMPLETED | OUTPATIENT
Start: 2022-12-07 | End: 2022-12-07

## 2022-12-07 RX ORDER — ONDANSETRON HYDROCHLORIDE 2 MG/ML
INJECTION, SOLUTION INTRAMUSCULAR; INTRAVENOUS
Status: DISCONTINUED | OUTPATIENT
Start: 2022-12-07 | End: 2022-12-07

## 2022-12-07 RX ORDER — FAMOTIDINE 10 MG/ML
INJECTION INTRAVENOUS
Status: COMPLETED
Start: 2022-12-07 | End: 2022-12-07

## 2022-12-07 RX ORDER — FENTANYL CITRATE 50 UG/ML
50 INJECTION, SOLUTION INTRAMUSCULAR; INTRAVENOUS
Status: DISCONTINUED | OUTPATIENT
Start: 2022-12-07 | End: 2022-12-07 | Stop reason: HOSPADM

## 2022-12-07 RX ORDER — METHOCARBAMOL 100 MG/ML
1000 INJECTION, SOLUTION INTRAMUSCULAR; INTRAVENOUS ONCE
Status: COMPLETED | OUTPATIENT
Start: 2022-12-07 | End: 2022-12-07

## 2022-12-07 RX ORDER — LIDOCAINE HYDROCHLORIDE 20 MG/ML
INJECTION, SOLUTION EPIDURAL; INFILTRATION; INTRACAUDAL; PERINEURAL
Status: DISCONTINUED | OUTPATIENT
Start: 2022-12-07 | End: 2022-12-07

## 2022-12-07 RX ORDER — ONDANSETRON 2 MG/ML
4 INJECTION INTRAMUSCULAR; INTRAVENOUS ONCE AS NEEDED
Status: DISCONTINUED | OUTPATIENT
Start: 2022-12-07 | End: 2022-12-07 | Stop reason: HOSPADM

## 2022-12-07 RX ORDER — PHENYLEPHRINE HCL IN 0.9% NACL 1 MG/10 ML
SYRINGE (ML) INTRAVENOUS
Status: DISCONTINUED | OUTPATIENT
Start: 2022-12-07 | End: 2022-12-07

## 2022-12-07 RX ORDER — ROCURONIUM BROMIDE 10 MG/ML
INJECTION, SOLUTION INTRAVENOUS
Status: DISCONTINUED | OUTPATIENT
Start: 2022-12-07 | End: 2022-12-07

## 2022-12-07 RX ORDER — MIDAZOLAM HYDROCHLORIDE 1 MG/ML
INJECTION INTRAMUSCULAR; INTRAVENOUS
Status: DISCONTINUED | OUTPATIENT
Start: 2022-12-07 | End: 2022-12-07

## 2022-12-07 RX ORDER — TRAMADOL HYDROCHLORIDE 50 MG/1
50 TABLET ORAL EVERY 6 HOURS PRN
Qty: 12 TABLET | Refills: 0 | Status: SHIPPED | OUTPATIENT
Start: 2022-12-07

## 2022-12-07 RX ORDER — FENTANYL CITRATE 50 UG/ML
50 INJECTION, SOLUTION INTRAMUSCULAR; INTRAVENOUS ONCE
Status: DISCONTINUED | OUTPATIENT
Start: 2022-12-07 | End: 2022-12-07 | Stop reason: HOSPADM

## 2022-12-07 RX ORDER — MEPERIDINE HYDROCHLORIDE 25 MG/ML
12.5 INJECTION INTRAMUSCULAR; INTRAVENOUS; SUBCUTANEOUS EVERY 10 MIN PRN
Status: DISCONTINUED | OUTPATIENT
Start: 2022-12-07 | End: 2022-12-07 | Stop reason: HOSPADM

## 2022-12-07 RX ADMIN — DEXAMETHASONE SODIUM PHOSPHATE 8 MG: 4 INJECTION, SOLUTION INTRA-ARTICULAR; INTRALESIONAL; INTRAMUSCULAR; INTRAVENOUS; SOFT TISSUE at 07:12

## 2022-12-07 RX ADMIN — FENTANYL CITRATE 50 MCG: 50 INJECTION, SOLUTION INTRAMUSCULAR; INTRAVENOUS at 09:12

## 2022-12-07 RX ADMIN — FAMOTIDINE 20 MG: 10 INJECTION INTRAVENOUS at 06:12

## 2022-12-07 RX ADMIN — ROCURONIUM BROMIDE 20 MG: 50 INJECTION INTRAVENOUS at 07:12

## 2022-12-07 RX ADMIN — SUGAMMADEX 200 MG: 100 INJECTION, SOLUTION INTRAVENOUS at 08:12

## 2022-12-07 RX ADMIN — METHOCARBAMOL 1000 MG: 100 INJECTION, SOLUTION INTRAMUSCULAR; INTRAVENOUS at 08:12

## 2022-12-07 RX ADMIN — EPHEDRINE SULFATE 25 MG: 50 INJECTION INTRAVENOUS at 07:12

## 2022-12-07 RX ADMIN — LIDOCAINE HYDROCHLORIDE 80 MG: 20 INJECTION, SOLUTION EPIDURAL; INFILTRATION; INTRACAUDAL; PERINEURAL at 07:12

## 2022-12-07 RX ADMIN — Medication 100 MCG: at 08:12

## 2022-12-07 RX ADMIN — ONDANSETRON 8 MG: 2 INJECTION INTRAMUSCULAR; INTRAVENOUS at 07:12

## 2022-12-07 RX ADMIN — VASOPRESSIN 2 UNITS: 20 INJECTION INTRAVENOUS at 07:12

## 2022-12-07 RX ADMIN — FENTANYL CITRATE 50 MCG: 50 INJECTION, SOLUTION INTRAMUSCULAR; INTRAVENOUS at 08:12

## 2022-12-07 RX ADMIN — MIDAZOLAM HYDROCHLORIDE 2 MG: 1 INJECTION, SOLUTION INTRAMUSCULAR; INTRAVENOUS at 07:12

## 2022-12-07 RX ADMIN — SODIUM CHLORIDE, SODIUM GLUCONATE, SODIUM ACETATE, POTASSIUM CHLORIDE AND MAGNESIUM CHLORIDE: 526; 502; 368; 37; 30 INJECTION, SOLUTION INTRAVENOUS at 07:12

## 2022-12-07 RX ADMIN — PROPOFOL 50 MG: 10 INJECTION, EMULSION INTRAVENOUS at 07:12

## 2022-12-07 RX ADMIN — ROCURONIUM BROMIDE 50 MG: 50 INJECTION INTRAVENOUS at 07:12

## 2022-12-07 RX ADMIN — FENTANYL CITRATE 100 MCG: 50 INJECTION, SOLUTION INTRAMUSCULAR; INTRAVENOUS at 07:12

## 2022-12-07 RX ADMIN — IPRATROPIUM BROMIDE AND ALBUTEROL SULFATE 3 ML: 2.5; .5 SOLUTION RESPIRATORY (INHALATION) at 06:12

## 2022-12-07 RX ADMIN — CEFUROXIME SODIUM 1.5 G: 1.5 INJECTION, POWDER, FOR SOLUTION INTRAVENOUS at 07:12

## 2022-12-07 RX ADMIN — PROPOFOL 200 MG: 10 INJECTION, EMULSION INTRAVENOUS at 07:12

## 2022-12-07 NOTE — ANESTHESIA PREPROCEDURE EVALUATION
"                                                                                                             12/07/2022  Benitez Garcia is a 62 y.o., male.      Pre-op Assessment    I have reviewed the Patient Summary Reports.     I have reviewed the Nursing Notes. I have reviewed the NPO Status.   I have reviewed the Medications.     Review of Systems  Anesthesia Hx:  No problems with previous Anesthesia    Social:  Smoker    Cardiovascular:   Denies Hypertension.  Denies MI.    Denies Angina.  Denies CHF. no hyperlipidemia    Pulmonary:   COPD (Home O2 qhs 2LPM), moderate Denies Asthma. Sleep Apnea, CPAP    Renal/:   Denies Chronic Renal Disease. no renal calculi     Hepatic/GI:   GERD, well controlled Denies Liver Disease. Denies Hepatitis.    Musculoskeletal:   Hx cervical spine surgery    Hx "back" surgery   Neurological:   Denies CVA. Denies Seizures.    Endocrine:   Denies Diabetes. Denies Hypothyroidism. Denies Hyperthyroidism.  Obesity / BMI > 30      Physical Exam  General: Well nourished, Cooperative, Alert and Oriented    Airway:  Mallampati: I   Mouth Opening: Normal  TM Distance: Normal  Tongue: Normal  Neck ROM: Normal ROM    Dental:        Anesthesia Plan  Type of Anesthesia, risks & benefits discussed:    Anesthesia Type: Gen ETT  Intra-op Monitoring Plan: Standard ASA Monitors and Art Line  Induction:  IV  Informed Consent: Informed consent signed with the Patient and all parties understand the risks and agree with anesthesia plan.  All questions answered.   ASA Score: 3  Day of Surgery Review of History & Physical: H&P Update referred to the surgeon/provider.    Ready For Surgery From Anesthesia Perspective.     .      "

## 2022-12-07 NOTE — DISCHARGE INSTRUCTIONS
No driving or alcohol consumption for 24 hours after procedure and for as long as you are on narcotic pain medication.    No heavy lifting, strenuous activity, or straining until cleared by your doctor.    Keep incisions clean and dry for 2 days. If you have an outer dressing, ok to remove in 48 hours then ok to shower afterwards. No tub baths or swimming until your doctor clears you. Do not apply any ointments to skin glue/steri strips. Do not peel off skin glue/steri strips.    Monitor for infection: redness, swelling, drainage, warmth, foul odor, fever, chills.

## 2022-12-07 NOTE — TRANSFER OF CARE
Anesthesia Transfer of Care Note    Patient: Benitez Garcia    Procedure(s) Performed: Procedure(s) (LRB):  MEDIASTINOSCOPY (N/A)    Patient location: PACU    Anesthesia Type: general    Transport from OR: Transported from OR on room air with adequate spontaneous ventilation    Post pain: adequate analgesia    Post assessment: no apparent anesthetic complications    Post vital signs: stable    Level of consciousness: awake    Nausea/Vomiting: no nausea/vomiting    Complications: none    Transfer of care protocol was followed      Last vitals:   Visit Vitals  /70   Pulse 60   Temp 37.1 °C (98.8 °F) (Oral)   Resp 18   SpO2 (!) 94%

## 2022-12-07 NOTE — ANESTHESIA POSTPROCEDURE EVALUATION
Anesthesia Post Evaluation    Patient: Benitez Garcia    Procedure(s) Performed: Procedure(s) (LRB):  MEDIASTINOSCOPY (N/A)    Final Anesthesia Type: general      Patient location during evaluation: PACU  Patient participation: Yes- Able to Participate  Level of consciousness: awake and alert  Post-procedure vital signs: reviewed and stable  Pain management: adequate  Airway patency: patent    PONV status at discharge: No PONV  Anesthetic complications: no      Cardiovascular status: blood pressure returned to baseline  Respiratory status: spontaneous ventilation and unassisted  Hydration status: euvolemic  Follow-up needed           Vitals Value Taken Time   /74 12/07/22 0957   Temp 95 12/07/22 1320   Pulse 83 12/07/22 0957   Resp 15 12/07/22 0902   SpO2 97 % 12/07/22 0957   Vitals shown include unvalidated device data.      Event Time   Out of Recovery 09:02:07         Pain/Erlinda Score: Pain Rating Prior to Med Admin: 7 (12/7/2022  9:00 AM)  Erlinda Score: 10 (12/7/2022 10:39 AM)  Modified Erlinda Score: 20 (12/7/2022 10:39 AM)

## 2022-12-08 LAB — M AVIUM PARATB TISS QL ZN STN: NORMAL

## 2022-12-09 VITALS
DIASTOLIC BLOOD PRESSURE: 74 MMHG | TEMPERATURE: 99 F | HEART RATE: 83 BPM | OXYGEN SATURATION: 97 % | RESPIRATION RATE: 22 BRPM | SYSTOLIC BLOOD PRESSURE: 130 MMHG

## 2022-12-10 LAB — BACTERIA SPEC ANAEROBE CULT: NORMAL

## 2022-12-12 LAB — BACTERIA SPEC CULT: NORMAL

## 2022-12-19 LAB
DHEA SERPL-MCNC: NORMAL
ESTROGEN SERPL-MCNC: NORMAL PG/ML
INSULIN SERPL-ACNC: NORMAL U[IU]/ML
LAB AP CLINICAL INFORMATION: NORMAL
LAB AP GROSS DESCRIPTION: NORMAL
LAB AP INTRA OP: NORMAL
LAB AP REPORT FOOTNOTES: NORMAL
T3RU NFR SERPL: NORMAL %

## 2023-01-09 LAB — FUNGUS SPEC CULT: NORMAL

## 2023-02-14 NOTE — DISCHARGE SUMMARY
[facility] - Periop Services  Cardiothoracic Surgery  Discharge Summary      Patient Name: Benitez Garcia  MRN: [id number]  Admission Date: 12/7/2022  Hospital Length of Stay: 0 days  Discharge Date and Time: 12/7/2022 10:39 AM  Attending Physician: No att. providers found   Discharging Provider: EMA Alvarez  Primary Care Provider: Primary Doctor No    HPI:   No notes on file    Procedure(s) (LRB):  MEDIASTINOSCOPY (N/A)  BIOPSY, LYMPH NODE      Indwelling Lines/Drains at time of discharge:   Lines/Drains/Airways     None               Hospital Course: No notes on file    Goals of Care Treatment Preferences:             Significant Diagnostic Studies: Labs: All labs within the past 24 hours have been reviewed    Pending Diagnostic Studies:     None          No new Assessment & Plan notes have been filed under this hospital service since the last note was generated.  Service: Cardiothoracic Surgery    Final Active Diagnoses:    Diagnosis Date Noted POA    PRINCIPAL PROBLEM:  Lymphadenopathy [R59.1] 12/07/2022 Yes      Problems Resolved During this Admission:      Discharged Condition: good    Disposition: Home or Self Care    Follow Up:   Follow-up Information     Isai Gregg Iv, MD Follow up.    Specialties: Cardiothoracic Surgery, Cardiology  Contact information:  [address] [place] [address]  [address]  [facility] [address] [phone]                       Patient Instructions:      Lifting restrictions     No dressing needed     Notify your health care provider if you experience any of the following:  temperature >100.4     Notify your health care provider if you experience any of the following:  persistent nausea and vomiting or diarrhea     Notify your health care provider if you experience any of the following:  severe uncontrolled pain     Notify your health care provider if you experience any of the following:  redness, tenderness, or signs of infection (pain, swelling, redness,  odor or green/yellow discharge around incision site)     Notify your health care provider if you experience any of the following:  difficulty breathing or increased cough     Notify your health care provider if you experience any of the following:  persistent dizziness, light-headedness, or visual disturbances     Notify your health care provider if you experience any of the following:  increased confusion or weakness     Activity as tolerated     Medications:  Reconciled Home Medications:      Medication List      START taking these medications    traMADoL 50 mg tablet  Commonly known as: ULTRAM  Take 1 tablet (50 mg total) by mouth every 6 (six) hours as needed for Pain.        CONTINUE taking these medications    albuterol 90 mcg/actuation inhaler  Commonly known as: PROVENTIL/VENTOLIN HFA  INHALE 2 PUFFS BY MOUTH EVERY 6 HOURS AS NEEDED FOR SHORTNESS OF BREATH     albuterol-ipratropium 2.5 mg-0.5 mg/3 mL nebulizer solution  Commonly known as: DUO-NEB  Take 3 mLs by nebulization every 6 (six) hours as needed for Wheezing. Rescue     amitriptyline 25 MG tablet  Commonly known as: ELAVIL  Take 25 mg by mouth every evening.     baclofen 20 MG tablet  Commonly known as: LIORESAL  Take 20 mg by mouth 2 (two) times daily.     cyanocobalamin 2000 MCG tablet  Take 2,500 mcg by mouth once daily.     DULoxetine 60 MG capsule  Commonly known as: CYMBALTA     ergocalciferol 50,000 unit Cap  Commonly known as: ERGOCALCIFEROL  Take 50,000 Units by mouth every 7 days.     HYDROcodone-acetaminophen  mg per tablet  Commonly known as: NORCO  Take 1 tablet by mouth every 6 to 8 hours as needed.     ibuprofen 800 MG tablet  Commonly known as: ADVIL,MOTRIN     omeprazole 40 MG capsule  Commonly known as: PRILOSEC  Take 40 mg by mouth once daily.        STOP taking these medications    oxyCODONE-acetaminophen  mg per tablet  Commonly known as: PERCOCET          Time spent on the discharge of patient: 33 minutes    Refugio P  EMA Meyer  Cardiothoracic Surgery  [facility] - Periop Services

## 2023-03-28 ENCOUNTER — PROCEDURE VISIT (OUTPATIENT)
Dept: RESPIRATORY THERAPY | Facility: HOSPITAL | Age: 63
End: 2023-03-28
Attending: INTERNAL MEDICINE
Payer: MEDICARE

## 2023-03-28 DIAGNOSIS — J44.9 CHRONIC OBSTRUCTIVE PULMONARY DISEASE, UNSPECIFIED COPD TYPE: ICD-10-CM

## 2023-03-28 PROCEDURE — 94729 DIFFUSING CAPACITY: CPT

## 2023-03-28 PROCEDURE — 94010 BREATHING CAPACITY TEST: CPT

## 2023-03-28 PROCEDURE — 94727 GAS DIL/WSHOT DETER LNG VOL: CPT

## 2024-09-04 ENCOUNTER — OFFICE VISIT (OUTPATIENT)
Dept: CARDIAC SURGERY | Facility: CLINIC | Age: 64
End: 2024-09-04
Payer: MEDICARE

## 2024-09-04 VITALS
WEIGHT: 248 LBS | SYSTOLIC BLOOD PRESSURE: 125 MMHG | HEART RATE: 74 BPM | HEIGHT: 66 IN | DIASTOLIC BLOOD PRESSURE: 78 MMHG | OXYGEN SATURATION: 93 % | BODY MASS INDEX: 39.86 KG/M2

## 2024-09-04 DIAGNOSIS — R59.0 MEDIASTINAL ADENOPATHY: ICD-10-CM

## 2024-09-04 DIAGNOSIS — Z79.01 ANTICOAGULATED: ICD-10-CM

## 2024-09-04 DIAGNOSIS — J84.10 PULMONARY FIBROSIS: Primary | ICD-10-CM

## 2024-09-04 DIAGNOSIS — J84.10 PULMONARY FIBROSIS: ICD-10-CM

## 2024-09-04 PROCEDURE — 99214 OFFICE O/P EST MOD 30 MIN: CPT | Mod: ,,, | Performed by: THORACIC SURGERY (CARDIOTHORACIC VASCULAR SURGERY)

## 2024-09-04 NOTE — PROGRESS NOTES
History & Physical    SUBJECTIVE:     History of Present Illness:  The patient is presenting for evaluation for possible lung biopsy.  He has been having prosthetic shortness of breath on exertion.  He had the mediastinoscopy by my partner in the past and this came back nondiagnostic.  He remains very symptomatic with findings on the CT scan.  Chief Complaint   Patient presents with    Pre-op Exam     REFERRAL-DR. KEYS-OPEN LUNG BX. DX: MEDIASTINAL LYMPHADENOPATHY W/ NEG PATH IN PAST, RT UPPER LOBE NODULES. PREV DR. CORDON PT-MEDIASTINOSCOPY 12/7/22. PMH: GERD, COPD, HLD, EMPHYSEMA, SLEEP APNEA-CPAP, DM, TOBACCO ABUSE.       Review of patient's allergies indicates:   Allergen Reactions    Oxycodone-acetaminophen Hives    Propoxyphene n-acetaminophen Hives    Sulfamethoxazole-trimethoprim Hives     And itching    Darvon [propoxyphene]     Lisinopril Other (See Comments)     Patient reports BP dropped to 84/42    Propoxyphene-asa-caffeine Other (See Comments)     Patient does not remember reaction  Itching       Current Outpatient Medications   Medication Sig Dispense Refill    albuterol (PROVENTIL/VENTOLIN HFA) 90 mcg/actuation inhaler INHALE 2 PUFFS BY MOUTH EVERY 6 HOURS AS NEEDED FOR SHORTNESS OF BREATH      albuterol-ipratropium (DUO-NEB) 2.5 mg-0.5 mg/3 mL nebulizer solution Take 3 mLs by nebulization every 6 (six) hours as needed for Wheezing. Rescue      amitriptyline (ELAVIL) 25 MG tablet Take 25 mg by mouth every evening.      baclofen (LIORESAL) 20 MG tablet Take 20 mg by mouth 2 (two) times daily.      budesonide-glycopyr-formoterol (BREZTRI AEROSPHERE) 160-9-4.8 mcg/actuation HFAA Inhale 2 puffs into the lungs 2 (two) times daily. 1 g 11    cyanocobalamin 2000 MCG tablet Take 2,500 mcg by mouth once daily.      DULoxetine (CYMBALTA) 60 MG capsule       ergocalciferol (ERGOCALCIFEROL) 50,000 unit Cap Take 50,000 Units by mouth every 7 days.      fluticasone furoate-vilanteroL (BREO ELLIPTA) 200-25  mcg/dose DsDv diskus inhaler Inhale 1 puff into the lungs once daily. Controller 1 each 12    HYDROcodone-acetaminophen (NORCO)  mg per tablet Take 1 tablet by mouth every 6 to 8 hours as needed.      ibuprofen (ADVIL,MOTRIN) 800 MG tablet       omeprazole (PRILOSEC) 40 MG capsule Take 40 mg by mouth once daily.       No current facility-administered medications for this visit.       Past Medical History:   Diagnosis Date    Acid reflux     COPD (chronic obstructive pulmonary disease)     Emphysema, unspecified     Sleep apnea, unspecified      Past Surgical History:   Procedure Laterality Date    DECOMPRESSION OF CERVICAL SPINE BY POSTERIOR APPROACH      ENDOBRONCHIAL ULTRASOUND N/A 10/05/2022    Procedure: ENDOBRONCHIAL ULTRASOUND (EBUS);  Surgeon: Chino David MD;  Location: [facility] BRONCHOSCOPY LAB;  Service: Pulmonary;  Laterality: N/A;    FRACTURE SURGERY      fusion left wrist      LYMPH NODE BIOPSY  12/7/2022    Procedure: BIOPSY, LYMPH NODE;  Surgeon: Isai Gregg IV, MD;  Location: [facility] OR;  Service: Cardiovascular;;    MEDIASTINOSCOPY N/A 12/7/2022    Procedure: MEDIASTINOSCOPY;  Surgeon: Isai Gregg IV, MD;  Location: [facility] OR;  Service: Cardiovascular;  Laterality: N/A;  with Lymph Node Biopsy    plate left ankle      right knee x2      SPINE SURGERY       Family History   Problem Relation Name Age of Onset    Hearing loss Mother      Cancer Brother       Social History     Tobacco Use    Smoking status: Every Day     Current packs/day: 0.50     Average packs/day: 0.5 packs/day for 48.0 years (24.0 ttl pk-yrs)     Types: Cigarettes    Smokeless tobacco: Current    Tobacco comments:     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 Been cutting down on cigarettes- 3/4 cig a day   Substance Use Topics    Alcohol use: Not Currently    Drug use: Never        Review of Systems:  Review of Systems   Constitutional: Negative.    HENT: Negative.     Eyes: Negative.    Respiratory:  Positive for  "shortness of breath.    Cardiovascular: Negative.    Gastrointestinal: Negative.    Endocrine: Negative.    Genitourinary: Negative.    Musculoskeletal: Negative.         Claudications   Skin: Negative.    Allergic/Immunologic: Negative.    Neurological: Negative.    Hematological: Negative.    Psychiatric/Behavioral: Negative.         OBJECTIVE:     Vital Signs (Most Recent)  Pulse: 74 (09/04/24 0900)  BP: 125/78 (09/04/24 0900)  SpO2: (!) 93 % (09/04/24 0900)  5' 6" (1.676 m)  112.5 kg (248 lb)     Physical Exam:  Physical Exam  Vitals reviewed.   Constitutional:       Appearance: Normal appearance.   HENT:      Head: Normocephalic and atraumatic.      Nose: Nose normal.      Mouth/Throat:      Mouth: Mucous membranes are dry.      Pharynx: Oropharynx is clear.   Eyes:      Extraocular Movements: Extraocular movements intact.      Conjunctiva/sclera: Conjunctivae normal.      Pupils: Pupils are equal, round, and reactive to light.   Cardiovascular:      Rate and Rhythm: Normal rate and regular rhythm.      Pulses: Normal pulses.   Pulmonary:      Effort: Pulmonary effort is normal.      Breath sounds: Normal breath sounds.   Abdominal:      General: Abdomen is flat.      Palpations: Abdomen is soft.   Musculoskeletal:         General: Normal range of motion.      Cervical back: Neck supple.   Skin:     General: Skin is warm and dry.   Neurological:      General: No focal deficit present.   Psychiatric:         Mood and Affect: Mood normal.         Laboratory:  None      Diagnostic Results:  Chest CT reviewed      ASSESSMENT/PLAN:     Shortness of breath on exertion with bilateral increased lung markings.  We will plan to do a left VATS lung biopsies.  He understands risks and benefits of the procedure including the risk of bleeding infection and prolonged air leak.  He elected to proceed                "

## 2024-09-19 ENCOUNTER — ANESTHESIA EVENT (OUTPATIENT)
Dept: SURGERY | Facility: HOSPITAL | Age: 64
End: 2024-09-19
Payer: MEDICARE

## 2024-09-24 ENCOUNTER — HOSPITAL ENCOUNTER (OUTPATIENT)
Dept: RADIOLOGY | Facility: HOSPITAL | Age: 64
Discharge: HOME | End: 2024-09-24
Attending: THORACIC SURGERY (CARDIOTHORACIC VASCULAR SURGERY)
Payer: MEDICARE

## 2024-09-24 DIAGNOSIS — J84.10 PULMONARY FIBROSIS: ICD-10-CM

## 2024-09-24 DIAGNOSIS — R59.0 MEDIASTINAL ADENOPATHY: ICD-10-CM

## 2024-09-24 PROCEDURE — 71046 X-RAY EXAM CHEST 2 VIEWS: CPT | Mod: TC

## 2024-09-30 NOTE — H&P
[facility]  History & Physical  Cardiothoracic Surgery    SUBJECTIVE:     Chief Complaint/Reason for Admission: shortness of breath    History of Present Illness:  Patient is a 64 y.o. male presents with The patient is presenting for evaluation for possible lung biopsy. He has been having prosthetic shortness of breath on exertion. He had the mediastinoscopy by my partner in the past and this came back nondiagnostic. He remains very symptomatic with findings on the CT scan. .      Family History of Heart Disease: yes    No current facility-administered medications on file prior to encounter.     Current Outpatient Medications on File Prior to Encounter   Medication Sig Dispense Refill    albuterol (PROVENTIL/VENTOLIN HFA) 90 mcg/actuation inhaler INHALE 2 PUFFS BY MOUTH EVERY 6 HOURS AS NEEDED FOR SHORTNESS OF BREATH      albuterol-ipratropium (DUO-NEB) 2.5 mg-0.5 mg/3 mL nebulizer solution Take 3 mLs by nebulization every 6 (six) hours as needed for Wheezing. Rescue      amitriptyline (ELAVIL) 25 MG tablet Take 25 mg by mouth every evening.      baclofen (LIORESAL) 20 MG tablet Take 20 mg by mouth 2 (two) times daily.      budesonide-glycopyr-formoterol (BREZTRI AEROSPHERE) 160-9-4.8 mcg/actuation HFAA Inhale 2 puffs into the lungs 2 (two) times daily. 1 g 11    cyanocobalamin 2000 MCG tablet Take 2,500 mcg by mouth once daily.      DULoxetine (CYMBALTA) 60 MG capsule       ergocalciferol (ERGOCALCIFEROL) 50,000 unit Cap Take 50,000 Units by mouth every 7 days.      fluticasone furoate-vilanteroL (BREO ELLIPTA) 200-25 mcg/dose DsDv diskus inhaler Inhale 1 puff into the lungs once daily. Controller 1 each 12    HYDROcodone-acetaminophen (NORCO)  mg per tablet Take 1 tablet by mouth every 6 to 8 hours as needed.      ibuprofen (ADVIL,MOTRIN) 800 MG tablet       omeprazole (PRILOSEC) 40 MG capsule Take 40 mg by mouth once daily.          Review of patient's allergies indicates:    Allergen Reactions    Oxycodone-acetaminophen Hives and Itching    Propoxyphene n-acetaminophen Hives    Sulfamethoxazole-trimethoprim Hives     And itching    Darvon [propoxyphene]     Lisinopril Other (See Comments)     Patient reports BP dropped to 84/42    Propoxyphene-asa-caffeine Other (See Comments)     Patient does not remember reaction  Itching        Past Medical History:   Diagnosis Date    Acid reflux     COPD (chronic obstructive pulmonary disease)     Diabetes mellitus     Emphysema, unspecified     Headache     Low back pain     Neuropathic pain, leg, right     Obesity     Pulmonary fibrosis, unspecified     Sleep apnea, unspecified     uses CPAP c oxygen        Past Surgical History:   Procedure Laterality Date    CERVICAL FUSION      COLONOSCOPY      ENDOBRONCHIAL ULTRASOUND N/A 10/05/2022    Procedure: ENDOBRONCHIAL ULTRASOUND (EBUS);  Surgeon: Chino David MD;  Location: [facility] BRONCHOSCOPY LAB;  Service: Pulmonary;  Laterality: N/A;    LUMBAR SPINE SURGERY      LYMPH NODE BIOPSY  12/07/2022    Procedure: BIOPSY, LYMPH NODE;  Surgeon: Isai Gregg IV, MD;  Location: [facility] OR;  Service: Cardiovascular;;    MEDIASTINOSCOPY N/A 12/07/2022    Procedure: MEDIASTINOSCOPY;  Surgeon: Isai Gregg IV, MD;  Location: [facility];  Service: Cardiovascular;  Laterality: N/A;  with Lymph Node Biopsy    OPEN REDUCTION AND INTERNAL FIXATION (ORIF) OF INJURY OF ANKLE Left     ORIF WRIST FRACTURE Left     REPAIR OF MENISCUS OF KNEE Right     x2           Review of Systems:  Review of Systems   Respiratory:  Positive for shortness of breath.    All other systems reviewed and are negative.       OBJECTIVE:        Physical Exam:  Physical Exam  Vitals reviewed.   HENT:      Head: Normocephalic.      Right Ear: Tympanic membrane normal.      Left Ear: Tympanic membrane normal.      Nose: Nose normal.      Mouth/Throat:      Mouth: Mucous membranes are moist.   Eyes:      Pupils: Pupils are equal, round, and  reactive to light.   Cardiovascular:      Rate and Rhythm: Normal rate and regular rhythm.      Pulses: Normal pulses.   Pulmonary:      Effort: Pulmonary effort is normal.      Breath sounds: Rhonchi present.   Abdominal:      Palpations: Abdomen is soft.   Musculoskeletal:         General: Normal range of motion.      Cervical back: Normal range of motion.   Skin:     General: Skin is warm.   Neurological:      General: No focal deficit present.      Mental Status: He is alert.   Psychiatric:         Mood and Affect: Mood normal.          Laboratory:  I have reviewed all pertinent lab results within the past 24 hours.    Diagnostic Results:  CT: Reviewed          ASSESSMENT/PLAN:   Shortness of breath on exertion with bilateral increased lung markings. We will plan to do a left VATS lung biopsies. He understands risks and benefits of the procedure including the risk of bleeding infection and prolonged air leak. He elected to proceed

## 2024-09-30 NOTE — PRE-PROCEDURE INSTRUCTIONS
[facility]: Outpatient Surgery   Preprocedure Check-In Instructions       Your arrival time for your surgery will be given to you by your surgeon's office.    We ask patients to arrive about 2 hours before surgery to allow for enough time to review your health history & medications, start your IV, complete any outstanding labwork or tests, and meet your Anesthesiologist.     You will arrive at [facility], [address] [place] [address]. Enter through the [place] entrance next to the Emergency Room, and come to the 6th floor to the Outpatient Surgery Department. If you need a wheelchair, please call [phone] for an attendant to meet you at the [facility] entrance with a wheelchair.    Wait Times:  Due to inconsistent procedure completion times, an unexpected wait may occur.  The physicians would like you to be here in the event they run ahead of time.  We will keep you comfortable and informed while you are waiting.  We apologize in advance if this happens.    Visitory Policy:   You are allowed 2 adult visitors to be with you in the hospital. All hospital visitors should be in good current health. No small children.     What to Bring:   Please have your ID, insurance cards, and all home medication bottles with you at check in. Bring your CPAP machine if one is used at home.     Fasting:   Nothing to eat or drink after midnight the night before your procedure. This includes no ice, gum, hard candies, and/or tobacco products.   Follow your doctor's instructions for taking any medications on the morning of your procedure. If no instructions for taking medications were given, do not take any medications but bring your medications in their bottles to your procedure check in.     Follow your doctor's preoperative instructions regarding skin prep, bowel prep, bathing, or showering prior to your procedure. If any special soaps were provided to you, please use according to your  doctor's instructions. If no instructions were given from your doctor, take a good bath or shower with antibacterial soap the night before and the morning of your procedure. On the morning of procedure, wear loose, comfortable clothing. No lotions, makeup, perfumes, colognes, deodorant, or jewelry to your procedure. Removable items (glasses, contact lenses, dentures, retainers, hearing aids) need to be removed for your procedure. Bring your storage containers for these items if you wear them.     Artificial nails, body jewelry, eyelash extensions, and/or hair extensions with metal clips are not allowed during your surgery. If you currently wear any of these items, please arrange for them to be removed prior to your arrival to the hospital.     Outpatient or Same Day Surgeries:   Any patients receiving sedation/anesthesia are advised not to drive for 24 hours after their procedure. We do not allow patients to drive themselves home after discharge. If you are going home after your procedure, please have someone available to drive you home from the hospital.     You may call the Outpatient Surgery Department at [phone] with any questions or concerns. We are looking forward to meeting you and taking great care of you for your procedure. Thank you for choosing [facility] for your surgical needs.       Status: complete  Spoke with: patient  Call Time: [phone]

## 2024-10-01 ENCOUNTER — ANESTHESIA (OUTPATIENT)
Dept: SURGERY | Facility: HOSPITAL | Age: 64
End: 2024-10-01
Payer: MEDICARE

## 2024-10-01 ENCOUNTER — HOSPITAL ENCOUNTER (INPATIENT)
Facility: HOSPITAL | Age: 64
LOS: 1 days | Discharge: HOME | DRG: 167 | End: 2024-10-02
Attending: THORACIC SURGERY (CARDIOTHORACIC VASCULAR SURGERY) | Admitting: THORACIC SURGERY (CARDIOTHORACIC VASCULAR SURGERY)
Payer: MEDICARE

## 2024-10-01 DIAGNOSIS — J84.10 PULMONARY FIBROSIS: ICD-10-CM

## 2024-10-01 DIAGNOSIS — R59.0 MEDIASTINAL LYMPHADENOPATHY: ICD-10-CM

## 2024-10-01 DIAGNOSIS — R59.0 MEDIASTINAL ADENOPATHY: ICD-10-CM

## 2024-10-01 DIAGNOSIS — J84.10 PULMONARY FIBROSIS, UNSPECIFIED: ICD-10-CM

## 2024-10-01 LAB
GLUCOSE SERPL-MCNC: 115 MG/DL (ref 70–110)
GLUCOSE SERPL-MCNC: 124 MG/DL (ref 70–110)
GRAM STN SPEC: NORMAL
HCO3 UR-SCNC: 28.6 MMOL/L (ref 24–28)
HCT VFR BLD CALC: 40 %PCV (ref 36–54)
HGB BLD-MCNC: 14 G/DL
KOH PREP SPEC: NORMAL
KOH PREP SPEC: NORMAL
PCO2 BLDA: 48.7 MMHG (ref 35–45)
PH SMN: 7.38 [PH] (ref 7.35–7.45)
PO2 BLDA: 52 MMHG (ref 80–100)
POC BE: 3 MMOL/L
POC IONIZED CALCIUM: 1.13 MMOL/L (ref 1.06–1.42)
POC SATURATED O2: 85 % (ref 95–100)
POC TCO2: 30 MMOL/L (ref 23–27)
POCT GLUCOSE: 115 MG/DL (ref 70–110)
POCT GLUCOSE: 96 MG/DL (ref 70–110)
POTASSIUM BLD-SCNC: 4 MMOL/L (ref 3.5–5.1)
SAMPLE: ABNORMAL
SODIUM BLD-SCNC: 139 MMOL/L (ref 136–145)

## 2024-10-01 PROCEDURE — 25000003 PHARM REV CODE 250: Performed by: THORACIC SURGERY (CARDIOTHORACIC VASCULAR SURGERY)

## 2024-10-01 PROCEDURE — 36000710: Performed by: THORACIC SURGERY (CARDIOTHORACIC VASCULAR SURGERY)

## 2024-10-01 PROCEDURE — 32667 THORACOSCOPY W/W RESECT ADDL: CPT | Mod: LT,,, | Performed by: THORACIC SURGERY (CARDIOTHORACIC VASCULAR SURGERY)

## 2024-10-01 PROCEDURE — 99499 UNLISTED E&M SERVICE: CPT | Mod: ,,, | Performed by: PHYSICIAN ASSISTANT

## 2024-10-01 PROCEDURE — 32667 THORACOSCOPY W/W RESECT ADDL: CPT | Mod: AS,LT,, | Performed by: PHYSICIAN ASSISTANT

## 2024-10-01 PROCEDURE — 37000009 HC ANESTHESIA EA ADD 15 MINS: Performed by: THORACIC SURGERY (CARDIOTHORACIC VASCULAR SURGERY)

## 2024-10-01 PROCEDURE — 63600175 PHARM REV CODE 636 W HCPCS: Performed by: ANESTHESIOLOGY

## 2024-10-01 PROCEDURE — 37000008 HC ANESTHESIA 1ST 15 MINUTES: Performed by: THORACIC SURGERY (CARDIOTHORACIC VASCULAR SURGERY)

## 2024-10-01 PROCEDURE — 87205 SMEAR GRAM STAIN: CPT | Performed by: THORACIC SURGERY (CARDIOTHORACIC VASCULAR SURGERY)

## 2024-10-01 PROCEDURE — 88341 IMHCHEM/IMCYTCHM EA ADD ANTB: CPT

## 2024-10-01 PROCEDURE — 27100171 HC OXYGEN HIGH FLOW UP TO 24 HOURS

## 2024-10-01 PROCEDURE — 99900035 HC TECH TIME PER 15 MIN (STAT)

## 2024-10-01 PROCEDURE — 88307 TISSUE EXAM BY PATHOLOGIST: CPT | Performed by: THORACIC SURGERY (CARDIOTHORACIC VASCULAR SURGERY)

## 2024-10-01 PROCEDURE — 71000039 HC RECOVERY, EACH ADD'L HOUR: Performed by: THORACIC SURGERY (CARDIOTHORACIC VASCULAR SURGERY)

## 2024-10-01 PROCEDURE — 87220 TISSUE EXAM FOR FUNGI: CPT | Performed by: THORACIC SURGERY (CARDIOTHORACIC VASCULAR SURGERY)

## 2024-10-01 PROCEDURE — 32666 THORACOSCOPY W/WEDGE RESECT: CPT | Mod: LT,,, | Performed by: THORACIC SURGERY (CARDIOTHORACIC VASCULAR SURGERY)

## 2024-10-01 PROCEDURE — 99024 POSTOP FOLLOW-UP VISIT: CPT | Mod: POP,,, | Performed by: PHYSICIAN ASSISTANT

## 2024-10-01 PROCEDURE — 63600175 PHARM REV CODE 636 W HCPCS: Performed by: NURSE ANESTHETIST, CERTIFIED REGISTERED

## 2024-10-01 PROCEDURE — 25000003 PHARM REV CODE 250: Performed by: NURSE ANESTHETIST, CERTIFIED REGISTERED

## 2024-10-01 PROCEDURE — 21400001 HC TELEMETRY ROOM

## 2024-10-01 PROCEDURE — 94760 N-INVAS EAR/PLS OXIMETRY 1: CPT

## 2024-10-01 PROCEDURE — 32666 THORACOSCOPY W/WEDGE RESECT: CPT | Mod: AS,LT,, | Performed by: PHYSICIAN ASSISTANT

## 2024-10-01 PROCEDURE — C1729 CATH, DRAINAGE: HCPCS | Performed by: THORACIC SURGERY (CARDIOTHORACIC VASCULAR SURGERY)

## 2024-10-01 PROCEDURE — 27000190 HC CPAP FULL FACE MASK W/VALVE

## 2024-10-01 PROCEDURE — 27201423 OPTIME MED/SURG SUP & DEVICES STERILE SUPPLY: Performed by: THORACIC SURGERY (CARDIOTHORACIC VASCULAR SURGERY)

## 2024-10-01 PROCEDURE — 63600175 PHARM REV CODE 636 W HCPCS: Performed by: THORACIC SURGERY (CARDIOTHORACIC VASCULAR SURGERY)

## 2024-10-01 PROCEDURE — 0BBG4ZX EXCISION OF LEFT UPPER LUNG LOBE, PERCUTANEOUS ENDOSCOPIC APPROACH, DIAGNOSTIC: ICD-10-PCS | Performed by: THORACIC SURGERY (CARDIOTHORACIC VASCULAR SURGERY)

## 2024-10-01 PROCEDURE — 25000242 PHARM REV CODE 250 ALT 637 W/ HCPCS

## 2024-10-01 PROCEDURE — 71000033 HC RECOVERY, INTIAL HOUR: Performed by: THORACIC SURGERY (CARDIOTHORACIC VASCULAR SURGERY)

## 2024-10-01 PROCEDURE — 25000003 PHARM REV CODE 250: Performed by: PHYSICIAN ASSISTANT

## 2024-10-01 PROCEDURE — 36000711: Performed by: THORACIC SURGERY (CARDIOTHORACIC VASCULAR SURGERY)

## 2024-10-01 PROCEDURE — 88342 IMHCHEM/IMCYTCHM 1ST ANTB: CPT

## 2024-10-01 PROCEDURE — 94660 CPAP INITIATION&MGMT: CPT

## 2024-10-01 PROCEDURE — 5A09357 ASSISTANCE WITH RESPIRATORY VENTILATION, LESS THAN 24 CONSECUTIVE HOURS, CONTINUOUS POSITIVE AIRWAY PRESSURE: ICD-10-PCS | Performed by: THORACIC SURGERY (CARDIOTHORACIC VASCULAR SURGERY)

## 2024-10-01 PROCEDURE — 36620 INSERTION CATHETER ARTERY: CPT | Performed by: NURSE ANESTHETIST, CERTIFIED REGISTERED

## 2024-10-01 PROCEDURE — 0BBJ4ZX EXCISION OF LEFT LOWER LUNG LOBE, PERCUTANEOUS ENDOSCOPIC APPROACH, DIAGNOSTIC: ICD-10-PCS | Performed by: THORACIC SURGERY (CARDIOTHORACIC VASCULAR SURGERY)

## 2024-10-01 PROCEDURE — 87075 CULTR BACTERIA EXCEPT BLOOD: CPT | Performed by: THORACIC SURGERY (CARDIOTHORACIC VASCULAR SURGERY)

## 2024-10-01 PROCEDURE — 87070 CULTURE OTHR SPECIMN AEROBIC: CPT | Performed by: THORACIC SURGERY (CARDIOTHORACIC VASCULAR SURGERY)

## 2024-10-01 RX ORDER — PREGABALIN 75 MG/1
75 CAPSULE ORAL 2 TIMES DAILY
COMMUNITY

## 2024-10-01 RX ORDER — ENOXAPARIN SODIUM 100 MG/ML
40 INJECTION SUBCUTANEOUS EVERY 24 HOURS
Status: DISCONTINUED | OUTPATIENT
Start: 2024-10-01 | End: 2024-10-02 | Stop reason: HOSPADM

## 2024-10-01 RX ORDER — PROPOFOL 10 MG/ML
VIAL (ML) INTRAVENOUS
Status: DISCONTINUED | OUTPATIENT
Start: 2024-10-01 | End: 2024-10-01

## 2024-10-01 RX ORDER — ONDANSETRON HYDROCHLORIDE 2 MG/ML
4 INJECTION, SOLUTION INTRAVENOUS DAILY PRN
Status: DISCONTINUED | OUTPATIENT
Start: 2024-10-01 | End: 2024-10-01 | Stop reason: HOSPADM

## 2024-10-01 RX ORDER — MUPIROCIN 20 MG/G
1 OINTMENT TOPICAL 2 TIMES DAILY
Status: DISCONTINUED | OUTPATIENT
Start: 2024-10-01 | End: 2024-10-02 | Stop reason: HOSPADM

## 2024-10-01 RX ORDER — ATORVASTATIN CALCIUM 40 MG/1
40 TABLET, FILM COATED ORAL DAILY
COMMUNITY

## 2024-10-01 RX ORDER — FENTANYL CITRATE 50 UG/ML
INJECTION, SOLUTION INTRAMUSCULAR; INTRAVENOUS
Status: DISCONTINUED | OUTPATIENT
Start: 2024-10-01 | End: 2024-10-01

## 2024-10-01 RX ORDER — TERBINAFINE HYDROCHLORIDE 250 MG/1
250 TABLET ORAL DAILY
COMMUNITY

## 2024-10-01 RX ORDER — HYDROCODONE BITARTRATE AND ACETAMINOPHEN 5; 325 MG/1; MG/1
1 TABLET ORAL EVERY 4 HOURS PRN
Status: DISCONTINUED | OUTPATIENT
Start: 2024-10-01 | End: 2024-10-02 | Stop reason: HOSPADM

## 2024-10-01 RX ORDER — OXYCODONE AND ACETAMINOPHEN 10; 325 MG/1; MG/1
1 TABLET ORAL EVERY 4 HOURS PRN
Status: DISCONTINUED | OUTPATIENT
Start: 2024-10-01 | End: 2024-10-02 | Stop reason: HOSPADM

## 2024-10-01 RX ORDER — METHOCARBAMOL 100 MG/ML
1000 INJECTION, SOLUTION INTRAMUSCULAR; INTRAVENOUS ONCE
Status: COMPLETED | OUTPATIENT
Start: 2024-10-01 | End: 2024-10-01

## 2024-10-01 RX ORDER — HYDROXYZINE HYDROCHLORIDE 25 MG/1
25 TABLET, FILM COATED ORAL NIGHTLY
COMMUNITY

## 2024-10-01 RX ORDER — ROCURONIUM BROMIDE 10 MG/ML
INJECTION, SOLUTION INTRAVENOUS
Status: DISCONTINUED | OUTPATIENT
Start: 2024-10-01 | End: 2024-10-01

## 2024-10-01 RX ORDER — EPHEDRINE SULFATE 50 MG/ML
INJECTION, SOLUTION INTRAVENOUS
Status: DISCONTINUED | OUTPATIENT
Start: 2024-10-01 | End: 2024-10-01

## 2024-10-01 RX ORDER — HYDROMORPHONE HYDROCHLORIDE 2 MG/ML
0.2 INJECTION, SOLUTION INTRAMUSCULAR; INTRAVENOUS; SUBCUTANEOUS EVERY 5 MIN PRN
Status: DISCONTINUED | OUTPATIENT
Start: 2024-10-01 | End: 2024-10-01 | Stop reason: HOSPADM

## 2024-10-01 RX ORDER — DIPHENHYDRAMINE HYDROCHLORIDE 50 MG/ML
25 INJECTION INTRAMUSCULAR; INTRAVENOUS EVERY 6 HOURS PRN
Status: DISCONTINUED | OUTPATIENT
Start: 2024-10-01 | End: 2024-10-01 | Stop reason: HOSPADM

## 2024-10-01 RX ORDER — CETIRIZINE HYDROCHLORIDE 10 MG/1
10 TABLET, CHEWABLE ORAL NIGHTLY
COMMUNITY

## 2024-10-01 RX ORDER — PHENYLEPHRINE HYDROCHLORIDE 10 MG/ML
INJECTION INTRAVENOUS
Status: DISCONTINUED | OUTPATIENT
Start: 2024-10-01 | End: 2024-10-01

## 2024-10-01 RX ORDER — METOCLOPRAMIDE HYDROCHLORIDE 5 MG/ML
5 INJECTION INTRAMUSCULAR; INTRAVENOUS EVERY 6 HOURS PRN
Status: DISCONTINUED | OUTPATIENT
Start: 2024-10-01 | End: 2024-10-02 | Stop reason: HOSPADM

## 2024-10-01 RX ORDER — MEPERIDINE HYDROCHLORIDE 25 MG/ML
12.5 INJECTION INTRAMUSCULAR; INTRAVENOUS; SUBCUTANEOUS EVERY 10 MIN PRN
Status: DISCONTINUED | OUTPATIENT
Start: 2024-10-01 | End: 2024-10-01 | Stop reason: HOSPADM

## 2024-10-01 RX ORDER — ONDANSETRON 4 MG/1
8 TABLET, ORALLY DISINTEGRATING ORAL EVERY 8 HOURS PRN
Status: DISCONTINUED | OUTPATIENT
Start: 2024-10-01 | End: 2024-10-02 | Stop reason: HOSPADM

## 2024-10-01 RX ORDER — IPRATROPIUM BROMIDE AND ALBUTEROL SULFATE 2.5; .5 MG/3ML; MG/3ML
SOLUTION RESPIRATORY (INHALATION)
Status: COMPLETED
Start: 2024-10-01 | End: 2024-10-01

## 2024-10-01 RX ORDER — SUCCINYLCHOLINE CHLORIDE 20 MG/ML
INJECTION INTRAMUSCULAR; INTRAVENOUS
Status: DISCONTINUED | OUTPATIENT
Start: 2024-10-01 | End: 2024-10-01

## 2024-10-01 RX ORDER — GLIPIZIDE 10 MG/1
10 TABLET, FILM COATED, EXTENDED RELEASE ORAL
COMMUNITY

## 2024-10-01 RX ORDER — KETOROLAC TROMETHAMINE 30 MG/ML
30 INJECTION, SOLUTION INTRAMUSCULAR; INTRAVENOUS ONCE
Status: COMPLETED | OUTPATIENT
Start: 2024-10-01 | End: 2024-10-01

## 2024-10-01 RX ORDER — MIDAZOLAM HYDROCHLORIDE 1 MG/ML
INJECTION INTRAMUSCULAR; INTRAVENOUS
Status: DISCONTINUED | OUTPATIENT
Start: 2024-10-01 | End: 2024-10-01

## 2024-10-01 RX ORDER — OXYCODONE AND ACETAMINOPHEN 5; 325 MG/1; MG/1
1 TABLET ORAL EVERY 4 HOURS PRN
Status: DISCONTINUED | OUTPATIENT
Start: 2024-10-01 | End: 2024-10-02 | Stop reason: HOSPADM

## 2024-10-01 RX ORDER — HYDROMORPHONE HYDROCHLORIDE 2 MG/ML
0.4 INJECTION, SOLUTION INTRAMUSCULAR; INTRAVENOUS; SUBCUTANEOUS EVERY 5 MIN PRN
Status: DISCONTINUED | OUTPATIENT
Start: 2024-10-01 | End: 2024-10-01 | Stop reason: HOSPADM

## 2024-10-01 RX ORDER — MUPIROCIN 20 MG/G
OINTMENT TOPICAL 2 TIMES DAILY
Status: CANCELLED | OUTPATIENT
Start: 2024-10-01 | End: 2024-10-06

## 2024-10-01 RX ADMIN — KETOROLAC TROMETHAMINE 30 MG: 30 INJECTION, SOLUTION INTRAMUSCULAR at 09:10

## 2024-10-01 RX ADMIN — EPHEDRINE SULFATE 10 MG: 50 INJECTION INTRAVENOUS at 07:10

## 2024-10-01 RX ADMIN — HYDROCODONE BITARTRATE AND ACETAMINOPHEN 1 TABLET: 5; 325 TABLET ORAL at 11:10

## 2024-10-01 RX ADMIN — FENTANYL CITRATE 100 MCG: 50 INJECTION, SOLUTION INTRAMUSCULAR; INTRAVENOUS at 07:10

## 2024-10-01 RX ADMIN — HYDROMORPHONE HYDROCHLORIDE 0.4 MG: 2 INJECTION INTRAMUSCULAR; INTRAVENOUS; SUBCUTANEOUS at 09:10

## 2024-10-01 RX ADMIN — EPHEDRINE SULFATE 10 MG: 50 INJECTION INTRAVENOUS at 08:10

## 2024-10-01 RX ADMIN — DEXTROSE MONOHYDRATE 1.5 G: 5 INJECTION INTRAVENOUS at 07:10

## 2024-10-01 RX ADMIN — MIDAZOLAM HYDROCHLORIDE 2 MG: 1 INJECTION, SOLUTION INTRAMUSCULAR; INTRAVENOUS at 06:10

## 2024-10-01 RX ADMIN — PROPOFOL 50 MG: 10 INJECTION, EMULSION INTRAVENOUS at 07:10

## 2024-10-01 RX ADMIN — PHENYLEPHRINE HYDROCHLORIDE 100 MCG: 10 INJECTION INTRAVENOUS at 07:10

## 2024-10-01 RX ADMIN — METHOCARBAMOL 1000 MG: 100 INJECTION INTRAMUSCULAR; INTRAVENOUS at 09:10

## 2024-10-01 RX ADMIN — PROPOFOL 200 MG: 10 INJECTION, EMULSION INTRAVENOUS at 07:10

## 2024-10-01 RX ADMIN — OXYCODONE AND ACETAMINOPHEN 1 TABLET: 10; 325 TABLET ORAL at 05:10

## 2024-10-01 RX ADMIN — SUCCINYLCHOLINE CHLORIDE 160 MG: 20 INJECTION, SOLUTION INTRAMUSCULAR; INTRAVENOUS at 07:10

## 2024-10-01 RX ADMIN — ROCURONIUM BROMIDE 5 MG: 10 SOLUTION INTRAVENOUS at 07:10

## 2024-10-01 RX ADMIN — HYDROMORPHONE HYDROCHLORIDE 0.4 MG: 2 INJECTION INTRAMUSCULAR; INTRAVENOUS; SUBCUTANEOUS at 08:10

## 2024-10-01 RX ADMIN — SODIUM CHLORIDE, SODIUM GLUCONATE, SODIUM ACETATE, POTASSIUM CHLORIDE AND MAGNESIUM CHLORIDE: 526; 502; 368; 37; 30 INJECTION, SOLUTION INTRAVENOUS at 06:10

## 2024-10-01 RX ADMIN — IPRATROPIUM BROMIDE AND ALBUTEROL SULFATE 3 ML: .5; 3 SOLUTION RESPIRATORY (INHALATION) at 08:10

## 2024-10-01 RX ADMIN — OXYCODONE HYDROCHLORIDE AND ACETAMINOPHEN 1 TABLET: 5; 325 TABLET ORAL at 01:10

## 2024-10-01 RX ADMIN — HYDROCODONE BITARTRATE AND ACETAMINOPHEN 1 TABLET: 5; 325 TABLET ORAL at 09:10

## 2024-10-01 RX ADMIN — ROCURONIUM BROMIDE 55 MG: 10 SOLUTION INTRAVENOUS at 07:10

## 2024-10-01 RX ADMIN — SUGAMMADEX 150 MG: 100 INJECTION, SOLUTION INTRAVENOUS at 08:10

## 2024-10-01 RX ADMIN — MUPIROCIN 1 G: 20 OINTMENT TOPICAL at 08:10

## 2024-10-01 NOTE — ANESTHESIA PREPROCEDURE EVALUATION
"                                                                                                             10/01/2024  Benitez Garcia is a 64 y.o., male.      Pre-op Assessment    I have reviewed the Patient Summary Reports.     I have reviewed the Nursing Notes. I have reviewed the NPO Status.   I have reviewed the Medications.     Review of Systems  Anesthesia Hx:  No problems with previous Anesthesia                Social:  Smoker       Pulmonary:   COPD (Home O2 qhs 2LPM), moderate     Sleep Apnea, CPAP Pulm fibrosis               Hepatic/GI:     GERD, well controlled             Musculoskeletal:     Hx cervical spine surgery    Hx "back" surgery            Neurological:      Headaches                                 Endocrine:  Diabetes         Obesity / BMI > 30      Physical Exam  General: Well nourished, Cooperative, Alert and Oriented    Airway:  Mallampati: II   Mouth Opening: Normal  TM Distance: Normal  Tongue: Normal  Neck ROM: Normal ROM    Dental:        Anesthesia Plan  Type of Anesthesia, risks & benefits discussed:    Anesthesia Type: Gen ETT  Intra-op Monitoring Plan: Standard ASA Monitors and Art Line  Induction:  IV  Informed Consent: Informed consent signed with the Patient and all parties understand the risks and agree with anesthesia plan.  All questions answered.   ASA Score: 3  Day of Surgery Review of History & Physical: H&P Update referred to the surgeon/provider.    Ready For Surgery From Anesthesia Perspective.     .    BMP  Lab Results   Component Value Date     09/24/2024    K 4.0 09/24/2024     09/24/2024    CO2 28 09/24/2024    BUN 24.8 09/24/2024    CREATININE 0.88 09/24/2024    CALCIUM 9.3 09/24/2024    ANIONGAP 6 (L) 02/15/2022    EGFRNORACEVR >60 09/24/2024     Lab Results   Component Value Date    WBC 11.89 (H) 09/24/2024    HGB 14.6 09/24/2024    HCT 43.9 09/24/2024    MCV 94.2 (H) 09/24/2024     09/24/2024         "

## 2024-10-01 NOTE — ANESTHESIA POSTPROCEDURE EVALUATION
Anesthesia Post Evaluation    Patient: Benitez Garcia    Procedure(s) Performed: Procedure(s) (LRB):  VATS (VIDEO-ASSISTED THORACOSCOPIC SURGERY) (Left)    Final Anesthesia Type: general      Patient location during evaluation: PACU  Patient participation: Yes- Able to Participate  Level of consciousness: awake and alert  Post-procedure vital signs: reviewed and stable  Pain management: adequate  Airway patency: patent    PONV status at discharge: No PONV  Anesthetic complications: no      Cardiovascular status: hemodynamically stable  Respiratory status: unassisted and spontaneous ventilation  Hydration status: euvolemic  Follow-up not needed.              Vitals Value Taken Time   /78 10/01/24 1114   Temp 36.3 °C (97.3 °F) 10/01/24 0840   Pulse 62 10/01/24 1114   Resp 19 10/01/24 1103   SpO2 93 % 10/01/24 1114   Vitals shown include unfiled device data.      Event Time   Out of Recovery 11:02:00         Pain/Erlinda Score: Pain Rating Prior to Med Admin: 7 (10/1/2024  9:30 AM)  Erlinda Score: 9 (10/1/2024 11:00 AM)

## 2024-10-01 NOTE — NURSING
Pts allergies in chart state he is allergic to oxycodone-acetaminophen, pt was given Norco 5 for pain and is stating it isnt helping. Nurse told pt the next step would be oxy and that it seems he is allergic in the chart. Pt stated he is NOT allergic and that he had poison ivy at the time he was taking and insisted hed try to take it for better pain control this hospital stay.

## 2024-10-01 NOTE — OP NOTE
[facility]                       [address] [id number]    PATIENT NAME:      LIBBY NAPOLES  YOB: 1960  CSN:               [id number]  MRN:               [id number]  ADMIT DATE:        10/01/2024 05:02:00  PHYSICIAN:         Darron Hughes MD                          OPERATIVE REPORT      DATE OF SURGERY:    10/01/2024 00:00:00    SURGEON:  Darron Hughes MD    PREOPERATIVE DIAGNOSIS:  Bilateral pulmonary infiltrate compatible with lung   disease.    PROCEDURES PERFORMED:    1. Left video-assisted thoracic surgery, left upper lobe wedge resection.   2. Left video-assisted thoracic surgery, left lower lobe wedge resection.    POSTOPERATIVE DIAGNOSIS:  Bilateral pulmonary infiltrate compatible with lung   disease.    ASSISTANT:  EMA Arvizu.    BLOOD LOSS:  Minimal.    ANESTHESIA:  General.    TECHNIQUE:  Under informed consent, the patient was taken to the OR, put in   supine position.  General anesthesia was induced and therefore maintained for   remainder of the procedure.  All pressure points padded equally.  The patient   was then turned in lateral decubitus position with the skin over the left chest   prepped and draped in usual sterile fashion.   IV antibiotics were administered.    The left lung was allowed to isolate, 8th intercostal space anterior axillary   line.  Incision was made followed by introduction of trocar.  Camera was placed.    I had good examination of the lung with no evidence of any pleural disease.    The lung looked normal on the outside.  There was no honeycombing.  Two ports   were placed and I was able to take a wedge from the posterior left upper lobe   and the posterior left lower lobe.  These were sent for both pathology and   cultures.  Chest tube was placed and positioned in the apex.  There was good   hemostasis.  The wounds were closed in layers  of absorbable sutures.  The   patient tolerated the procedure well.        ______________________________  MD RICARDA Cordoba/ARPIT  DD:  10/01/2024  Time:  11:25AM  DT:  10/01/2024  Time:  11:53AM  Job #:  [id number]      OPERATIVE REPORT

## 2024-10-01 NOTE — TRANSFER OF CARE
"Anesthesia Transfer of Care Note    Patient: Benitez Garcia    Procedure(s) Performed: Procedure(s) (LRB):  VATS (VIDEO-ASSISTED THORACOSCOPIC SURGERY) (Left)    Patient location: PACU    Anesthesia Type: general    Transport from OR: Transported from OR on 6-10 L/min O2 by face mask with adequate spontaneous ventilation    Post pain: adequate analgesia    Post assessment: no apparent anesthetic complications    Post vital signs: stable    Level of consciousness: awake and sedated    Nausea/Vomiting: no nausea/vomiting    Complications: none    Transfer of care protocol was followed      Last vitals: Visit Vitals  BP (!) 92/51   Pulse (!) 45   Temp 36.3 °C (97.3 °F) (Temporal)   Resp (!) 26   Ht 5' 6" (1.676 m)   Wt 110 kg (242 lb 8.1 oz)   SpO2 (!) 87%   BMI 39.14 kg/m²     "

## 2024-10-01 NOTE — ANESTHESIA PROCEDURE NOTES
Arterial    Diagnosis: Coronary artery disease    Patient location during procedure: holding area    Staffing  Authorizing Provider: Harley Castañeda DO  Performing Provider: Servando Werner CRNA    Staffing  Performed by: Servando Werner CRNA  Authorized by: Harley Castañeda DO    Anesthesiologist was present at the time of the procedure.    Preanesthetic Checklist  Completed: patient identified, IV checked, site marked, risks and benefits discussed, surgical consent, monitors and equipment checked, pre-op evaluation, timeout performed and anesthesia consent givenArterial  Skin Prep: alcohol swabs  Local Infiltration: none  Orientation: right  Location: radial    Catheter Size: 20 G  Catheter placement by Anatomical landmarks. Heme positive aspiration all ports. Insertion Attempts: 1  Assessment  Dressing: secured with tape and tegaderm  Patient: Tolerated well

## 2024-10-01 NOTE — ANESTHESIA PROCEDURE NOTES
Intubation    Date/Time: 10/1/2024 7:06 AM    Performed by: Servando Werner CRNA  Authorized by: Harley Castañeda DO    Intubation:     Induction:  Intravenous    Intubated:  Postinduction    Mask Ventilation:  Easy with oral airway    Attempts:  1    Attempted By:  CRNA and student    Method of Intubation:  Direct    Blade:  Gardner 2    Laryngeal View Grade: Grade I - full view of cords      Difficult Airway Encountered?: No      Complications:  None    Airway Device:  Double lumen tube left    Airway Device Size:  37F    Style/Cuff Inflation:  Cuffed (inflated to minimal occlusive pressure)    Tube secured:  31    Secured at:  The lips    Placement Verified By:  Capnometry and Fiber optic visualization    Complicating Factors:  None    Findings Post-Intubation:  BS equal bilateral and atraumatic/condition of teeth unchanged

## 2024-10-02 VITALS
WEIGHT: 242.5 LBS | OXYGEN SATURATION: 95 % | TEMPERATURE: 98 F | DIASTOLIC BLOOD PRESSURE: 80 MMHG | RESPIRATION RATE: 18 BRPM | SYSTOLIC BLOOD PRESSURE: 160 MMHG | HEART RATE: 84 BPM | HEIGHT: 66 IN | BODY MASS INDEX: 38.97 KG/M2

## 2024-10-02 PROCEDURE — 25000003 PHARM REV CODE 250: Performed by: PHYSICIAN ASSISTANT

## 2024-10-02 RX ORDER — HYDROCODONE BITARTRATE AND ACETAMINOPHEN 5; 325 MG/1; MG/1
1 TABLET ORAL EVERY 4 HOURS PRN
Qty: 40 TABLET | Refills: 0 | Status: SHIPPED | OUTPATIENT
Start: 2024-10-02

## 2024-10-02 RX ADMIN — OXYCODONE AND ACETAMINOPHEN 1 TABLET: 10; 325 TABLET ORAL at 08:10

## 2024-10-02 RX ADMIN — OXYCODONE HYDROCHLORIDE AND ACETAMINOPHEN 1 TABLET: 5; 325 TABLET ORAL at 05:10

## 2024-10-02 RX ADMIN — OXYCODONE AND ACETAMINOPHEN 1 TABLET: 10; 325 TABLET ORAL at 01:10

## 2024-10-02 RX ADMIN — HYDROCODONE BITARTRATE AND ACETAMINOPHEN 1 TABLET: 5; 325 TABLET ORAL at 10:10

## 2024-10-02 RX ADMIN — OXYCODONE HYDROCHLORIDE AND ACETAMINOPHEN 1 TABLET: 5; 325 TABLET ORAL at 01:10

## 2024-10-02 RX ADMIN — HYDROCODONE BITARTRATE AND ACETAMINOPHEN 1 TABLET: 5; 325 TABLET ORAL at 02:10

## 2024-10-02 NOTE — PLAN OF CARE
Problem: Adult Inpatient Plan of Care  Goal: Plan of Care Review  Outcome: Adequate for Care Transition  Goal: Patient-Specific Goal (Individualized)  Outcome: Adequate for Care Transition  Goal: Absence of Hospital-Acquired Illness or Injury  Outcome: Adequate for Care Transition  Goal: Optimal Comfort and Wellbeing  Outcome: Adequate for Care Transition  Goal: Readiness for Transition of Care  Outcome: Adequate for Care Transition     Problem: Bariatric Environmental Safety  Goal: Safety Maintained with Care  Outcome: Adequate for Care Transition     Problem: Infection  Goal: Absence of Infection Signs and Symptoms  Outcome: Adequate for Care Transition     Problem: Wound  Goal: Optimal Coping  Outcome: Adequate for Care Transition  Goal: Optimal Functional Ability  Outcome: Adequate for Care Transition  Goal: Absence of Infection Signs and Symptoms  Outcome: Adequate for Care Transition  Goal: Improved Oral Intake  Outcome: Adequate for Care Transition  Goal: Optimal Pain Control and Function  Outcome: Adequate for Care Transition  Goal: Skin Health and Integrity  Outcome: Adequate for Care Transition  Goal: Optimal Wound Healing  Outcome: Adequate for Care Transition     Problem: Fall Injury Risk  Goal: Absence of Fall and Fall-Related Injury  Outcome: Adequate for Care Transition

## 2024-10-02 NOTE — PLAN OF CARE
10/02/24 1402   Discharge Assessment   Assessment Type Discharge Planning Assessment   Confirmed/corrected address, phone number and insurance Yes   Confirmed Demographics Correct on Facesheet   Source of Information patient;family   When was your last doctors appointment? 08/26/24   Reason For Admission LEELEE wedge resection   People in Home spouse   Do you expect to return to your current living situation? Yes   Do you have help at home or someone to help you manage your care at home? Yes   Who are your caregiver(s) and their phone number(s)? wife Amparo   Prior to hospitilization cognitive status: Alert/Oriented   Current cognitive status: Alert/Oriented   Walking or Climbing Stairs Difficulty no   Dressing/Bathing Difficulty no   Home Accessibility stairs to enter home   Number of Stairs, Main Entrance five   Stair Railings, Main Entrance railings on both sides of stairs   Home Layout Able to live on 1st floor   Equipment Currently Used at Home CPAP   Readmission within 30 days? No   Patient currently being followed by outpatient case management? No   Do you currently have service(s) that help you manage your care at home? No   Do you take prescription medications? Yes   Do you have any problems affording any of your prescribed medications? No   Is the patient taking medications as prescribed? yes   Who is going to help you get home at discharge? dgt   How do you get to doctors appointments? car, drives self   Are you on dialysis? No   Do you take coumadin? No   Discharge Plan A Home   Discharge Plan B Home   DME Needed Upon Discharge  none   Discharge Plan discussed with: Patient;Adult children   Transition of Care Barriers None   Financial Resource Strain   How hard is it for you to pay for the very basics like food, housing, medical care, and heating? Somewhat   Housing Stability   In the last 12 months, was there a time when you were not able to pay the mortgage or rent on time? N   At any time in the past  12 months, were you homeless or living in a shelter (including now)? N   Transportation Needs   Has the lack of transportation kept you from medical appointments, meetings, work or from getting things needed for daily living? No   Food Insecurity   Within the past 12 months, you worried that your food would run out before you got the money to buy more. Sometimes   Within the past 12 months, the food you bought just didn't last and you didn't have money to get more. Never true   Stress   Do you feel stress - tense, restless, nervous, or anxious, or unable to sleep at night because your mind is troubled all the time - these days? Not at all   Social Isolation   How often do you feel lonely or isolated from those around you?  Never   Alcohol Use   Q1: How often do you have a drink containing alcohol? Never   Q2: How many drinks containing alcohol do you have on a typical day when you are drinking? None   Q3: How often do you have six or more drinks on one occasion? Never   Utilities   In the past 12 months has the electric, gas, oil, or water company threatened to shut off services in your home? No   Health Literacy   How often do you need to have someone help you when you read instructions, pamphlets, or other written material from your doctor or pharmacy? Never   OTHER   Name(s) of People in Home Amparo (wife)     Pharmacy: [facility]  Pt states he is indep in adl's , no dme/hh  Pt denies any dc needs.

## 2024-10-02 NOTE — NURSING
AVS virtually reviewed with patient in its entirety with emphasis on diet, medications, follow-up appointments and reasons to return to the ED or contact the [facility] On [facility] Line. Patient also encouraged to utilize their patient portal. Ease and convenience of use reiterated. Education complete and patient and family voiced understanding. All questions answered. Discharge teaching complete.

## 2024-10-02 NOTE — PLAN OF CARE
Problem: Adult Inpatient Plan of Care  Goal: Plan of Care Review  Outcome: Progressing  Goal: Patient-Specific Goal (Individualized)  Outcome: Progressing  Goal: Absence of Hospital-Acquired Illness or Injury  Outcome: Progressing  Goal: Optimal Comfort and Wellbeing  Outcome: Progressing  Goal: Readiness for Transition of Care  Outcome: Progressing     Problem: Bariatric Environmental Safety  Goal: Safety Maintained with Care  Outcome: Progressing     Problem: Infection  Goal: Absence of Infection Signs and Symptoms  Outcome: Progressing     Problem: Wound  Goal: Optimal Coping  Outcome: Progressing  Goal: Optimal Functional Ability  Outcome: Progressing  Goal: Absence of Infection Signs and Symptoms  Outcome: Progressing  Goal: Improved Oral Intake  Outcome: Progressing  Goal: Optimal Pain Control and Function  Outcome: Progressing  Goal: Skin Health and Integrity  Outcome: Progressing  Goal: Optimal Wound Healing  Outcome: Progressing     Problem: Fall Injury Risk  Goal: Absence of Fall and Fall-Related Injury  Outcome: Progressing

## 2024-10-02 NOTE — PLAN OF CARE
10/02/24 1330   Medicare Message   Important Message from Medicare regarding Discharge Appeal Rights Given to patient/caregiver;Explained to patient/caregiver

## 2024-10-02 NOTE — PROGRESS NOTES
"Cardiothoracic Surgery   Daily Progress Note     HD#1  POD#1 Day Post-Op    Subjective  POD 1 s/p L upper and lower lobe wedge resection  Pathology pending  Pain controlled  120 cc serosanguinous CT output  No air leak  2L UOP  Afebrile, VSS     Scheduled Meds:   enoxparin  40 mg Subcutaneous Daily    mupirocin  1 g Nasal BID       Continuous Infusions:    PRN Meds:  Current Facility-Administered Medications:     HYDROcodone-acetaminophen, 1 tablet, Oral, Q4H PRN    metoclopramide, 5 mg, Intravenous, Q6H PRN    ondansetron, 8 mg, Oral, Q8H PRN    oxyCODONE-acetaminophen, 1 tablet, Oral, Q4H PRN    oxyCODONE-acetaminophen, 1 tablet, Oral, Q4H PRN     Objective  Temp:  [97.3 °F (36.3 °C)-98.6 °F (37 °C)] 97.6 °F (36.4 °C)  Pulse:  [45-62] 56  Resp:  [13-27] 18  SpO2:  [87 %-100 %] 92 %  BP: ()/(46-99) 131/84     I/O last 3 completed shifts:  In: 1140 [P.O.:240; IV Piggyback:900]  Out: 700 [Urine:700]  I/O this shift:  In: 150 [P.O.:150]  Out: 1350 [Urine:1350]     GEN: NAD  NEURO: AAOx3  RESP: Using home CPAP overnight, no SOB. Equal rise and fall of chest. CT in place L chest to suction. 120cc serosanguinous output. No airleak.  CV: RR  ABD: Soft, NT, ND, no guarding/rebound  EXT: moving all     Labs  Recent Labs     10/01/24  0855   HCT 40     No results for input(s): "NA", "K", "CL", "CO2", "BUN", "CREATININE", "GLU", "CALCIUM", "MG", "PHOS", "PROT", "ALBUMIN", "BILITOT", "AST", "ALKPHOS", "ALT" in the last 72 hours.    Imaging  X-Ray Chest AP Single View   Final Result      Interval insertion of left-sided chest tube.      Poor inspiratory effort that accentuates the pulmonary vascular markings.      No definite focal consolidative changes         Electronically signed by: Pk Gooden   Date:    10/01/2024   Time:    10:10          Assessment/Plan  63yo M POD 1 Left VATs with upper and lower lobe wedge resection.    Afebrile, VSS, pain controlled. L CT with no air leak.    CT to water seal  Daily " CXR  DC loredo  Diet  CPAP nightly  MMPC prn  Lovenox DVT ppx  Out of bed, ambulate  Incentive spirometer  I&Os    Kemal Bunn MD  Surgery PGY 4      10/2/2024  6:35 AM

## 2024-10-02 NOTE — DISCHARGE INSTRUCTIONS
THORACIC SURGERY, THORACOTOMY, VATS    · WHAT IS THORACIC SURGERY?  o Thoracic surgery is surgery on the chest (thorax) and the organs inside the chest.  o The chest includes:  § The heart and its large blood vessels.  § Cartilage tube which gets air into the lungs (trachea)  § Right and left lungs  § Food tube from the mouth to the stomach (esophagus)  § All the other tissues supporting those structures.    · POST OP CARE:  o SHOWER ONLY with antibacterial soap and water only.  o Keep the incision clean and dry. No ointments, powders, lotions, or colognes near incisions until fully healed.  o Monitor incisions for signs or symptoms of infection  § Fever >101  § Yellow/green drainage  § Swelling  § Worsening pain  o No straining, lifting, pushing, or pulling anything heavier than 5-10 pounds (a gallon of milk) for 4-6 weeks.  o Continue to use your incentive spirometer (breathing exercises) 10 times every hour while awake for at least 2 weeks.  o Wear compression stockings during the day until you are up and walking frequently and you have no real swelling. (Usually, 2 weeks)  o Chest tubes are used after surgery for drainage after thoracic surgery. They are removed prior to discharge. Depending on the surgeon, you may go home with a stitch. If you do, leave it open to air unless it starts leaking consistently then you may cover it with gauze and tape. It is okay to wash this area in the shower with soap and water with or without sutures. Monitor for signs of infection.  § Sutures are either removed by home health or in the office 5-7 days after discharge.  o NO DRIVING UNTIL CLEARED BY SURGEON AT FOLLOW UP APPOINTMENT

## 2024-10-03 NOTE — DISCHARGE SUMMARY
[facility] 6th Floor Medical Telemetry  Cardiothoracic Surgery  Discharge Summary      Patient Name: Benitez Garcai  MRN: [id number]  Admission Date: 10/1/2024  Hospital Length of Stay: 1 days  Discharge Date and Time: 10/2/2024  4:29 PM  Attending Physician: No att. providers found   Discharging Provider: EMA Alvarez  Primary Care Provider: Finesse Rowland MD    HPI:   No notes on file    Procedure(s) (LRB):  VATS (VIDEO-ASSISTED THORACOSCOPIC SURGERY) (Left)      Indwelling Lines/Drains at time of discharge:   Lines/Drains/Airways       None                 Hospital Course: No notes on file    Goals of Care Treatment Preferences:             Significant Diagnostic Studies: Labs: All labs within the past 24 hours have been reviewed    Pending Diagnostic Studies:       Procedure Component Value Units Date/Time    Specimen to Pathology [[id number]] Collected: 10/01/24 0751    Order Status: Sent Lab Status: In process Updated: 10/01/24 0800    Specimen: Tissue from Lung, LEELEE; Tissue from Lung, LEELEE; Tissue from Lung, LLL; Tissue from Lung, LLL             No new Assessment & Plan notes have been filed under this hospital service since the last note was generated.  Service: Cardiothoracic Surgery    Final Active Diagnoses:    Diagnosis Date Noted POA    PRINCIPAL PROBLEM:  Lymphadenopathy [R59.1] 12/07/2022 Yes      Problems Resolved During this Admission:      Discharged Condition: good    Disposition: Home or Self Care    Follow Up:   Follow-up Information       Finesse Rowland MD Follow up on 10/17/2024.    Specialty: Pediatrics  Why: @9:00  Contact information:  [address] [place] [id number]  [phone]               Darron Hughes MD Follow up.    Specialties: Cardiothoracic Surgery, Cardiology  Why: suture removal- 10/9 @8:20  follow up-10/23@10:20  Contact information:  [address] [place] [address]  [address]  [facility] [address]  [phone]                           Patient  Instructions:   No discharge procedures on file.  Medications:  Reconciled Home Medications:      Medication List        CHANGE how you take these medications      * HYDROcodone-acetaminophen  mg per tablet  Commonly known as: NORCO  Take 1 tablet by mouth every 6 to 8 hours as needed.  What changed: Another medication with the same name was added. Make sure you understand how and when to take each.     * HYDROcodone-acetaminophen 5-325 mg per tablet  Commonly known as: NORCO  Take 1 tablet by mouth every 4 (four) hours as needed for Pain.  What changed: You were already taking a medication with the same name, and this prescription was added. Make sure you understand how and when to take each.           * This list has 2 medication(s) that are the same as other medications prescribed for you. Read the directions carefully, and ask your doctor or other care provider to review them with you.                CONTINUE taking these medications      albuterol 90 mcg/actuation inhaler  Commonly known as: PROVENTIL/VENTOLIN HFA  INHALE 2 PUFFS BY MOUTH EVERY 6 HOURS AS NEEDED FOR SHORTNESS OF BREATH     albuterol-ipratropium 2.5 mg-0.5 mg/3 mL nebulizer solution  Commonly known as: DUO-NEB  Take 3 mLs by nebulization every 6 (six) hours as needed for Wheezing. Rescue     atorvastatin 40 MG tablet  Commonly known as: LIPITOR  Take 40 mg by mouth once daily.     baclofen 20 MG tablet  Commonly known as: LIORESAL  Take 20 mg by mouth 2 (two) times daily.     BREZTRI AEROSPHERE 160-9-4.8 mcg/actuation Hfaa  Generic drug: budesonide-glycopyr-formoterol  Inhale 2 puffs into the lungs 2 (two) times daily.     cetirizine 10 mg chewable tablet  Take 10 mg by mouth every evening.     cyanocobalamin 2000 MCG tablet  Take 2,500 mcg by mouth once daily.     DULoxetine 60 MG capsule  Commonly known as: CYMBALTA     ergocalciferol 50,000 unit Cap  Commonly known as: ERGOCALCIFEROL  Take 50,000 Units by mouth every 7 days.      fluticasone furoate-vilanteroL 200-25 mcg/dose Dsdv diskus inhaler  Commonly known as: BREO ELLIPTA  Inhale 1 puff into the lungs once daily. Controller     glipiZIDE 10 MG Tr24  Commonly known as: GLUCOTROL  Take 10 mg by mouth daily with breakfast.     hydrOXYzine HCL 25 MG tablet  Commonly known as: ATARAX  Take 25 mg by mouth every evening.     ibuprofen 800 MG tablet  Commonly known as: ADVIL,MOTRIN     omeprazole 40 MG capsule  Commonly known as: PRILOSEC  Take 40 mg by mouth once daily.     pregabalin 75 MG capsule  Commonly known as: LYRICA  Take 75 mg by mouth 2 (two) times daily.     terbinafine  mg tablet  Commonly known as: LAMISIL  Take 250 mg by mouth once daily.            STOP taking these medications      amitriptyline 25 MG tablet  Commonly known as: ELAVIL            Time spent on the discharge of patient: 33 minutes    EMA Alvarez  Cardiothoracic Surgery  [facility] - 6th Floor Medical Telemetry

## 2024-10-04 LAB
BACTERIA SPEC ANAEROBE CULT: NORMAL
BACTERIA SPEC ANAEROBE CULT: NORMAL

## 2024-10-06 LAB
BACTERIA TISS AEROBE CULT: NORMAL
BACTERIA TISS AEROBE CULT: NORMAL

## 2024-10-09 ENCOUNTER — CLINICAL SUPPORT (OUTPATIENT)
Dept: CARDIAC SURGERY | Facility: CLINIC | Age: 64
End: 2024-10-09
Payer: MEDICARE

## 2024-10-09 DIAGNOSIS — R59.0 MEDIASTINAL ADENOPATHY: Primary | ICD-10-CM

## 2024-10-09 NOTE — PROGRESS NOTES
NURSE VISIT/SUTURE REMOVAL-S/P 10/1/24-LT VATS, LT LOWER LOBE WEDGE RESECTION. DX: BILAT PULMONARY INFILTRATE COMPATIBLE W/ LUNG DISEASE.   Suture x 1 removed left side of chest, tolerated well, no bleeding or s/x of infection.

## 2024-10-14 LAB — FUNGUS SPEC CULT: ABNORMAL

## 2024-10-21 LAB — FUNGUS SPEC CULT: ABNORMAL

## 2024-10-23 ENCOUNTER — OFFICE VISIT (OUTPATIENT)
Dept: CARDIAC SURGERY | Facility: CLINIC | Age: 64
End: 2024-10-23
Payer: MEDICARE

## 2024-10-23 VITALS
WEIGHT: 252 LBS | SYSTOLIC BLOOD PRESSURE: 129 MMHG | OXYGEN SATURATION: 95 % | HEART RATE: 75 BPM | BODY MASS INDEX: 40.5 KG/M2 | DIASTOLIC BLOOD PRESSURE: 82 MMHG | HEIGHT: 66 IN

## 2024-10-23 DIAGNOSIS — E66.9 OBESITY, UNSPECIFIED CLASS, UNSPECIFIED OBESITY TYPE, UNSPECIFIED WHETHER SERIOUS COMORBIDITY PRESENT: ICD-10-CM

## 2024-10-23 DIAGNOSIS — J84.10 PULMONARY FIBROSIS: Primary | ICD-10-CM

## 2024-10-23 PROCEDURE — 99024 POSTOP FOLLOW-UP VISIT: CPT | Mod: POP,,, | Performed by: THORACIC SURGERY (CARDIOTHORACIC VASCULAR SURGERY)

## 2024-10-23 NOTE — PROGRESS NOTES
"Benitez Garcia is a 64 y.o. male patient.   No diagnosis found.  Past Medical History:   Diagnosis Date    Acid reflux     COPD (chronic obstructive pulmonary disease)     Diabetes mellitus     Emphysema, unspecified     Headache     Low back pain     Neuropathic pain, leg, right     Obesity     Pulmonary fibrosis, unspecified     Sleep apnea, unspecified     uses CPAP c oxygen     Past Surgical History Pertinent Negatives:   Procedure Date Noted    APPENDECTOMY 07/01/2020    CHOLECYSTECTOMY 07/01/2020    DEEP BRAIN STIMULATOR PLACEMENT 07/01/2020    SINUS SURGERY 07/01/2020    VENTRICULOPERITONEAL SHUNT 07/01/2020     Scheduled Meds:  Continuous Infusions:  PRN Meds:    Review of patient's allergies indicates:   Allergen Reactions    Oxycodone-acetaminophen Hives and Itching    Propoxyphene n-acetaminophen Hives    Sulfamethoxazole-trimethoprim Hives     And itching    Darvon [propoxyphene]     Lisinopril Other (See Comments)     Patient reports BP dropped to 84/42    Propoxyphene-asa-caffeine Other (See Comments)     Patient does not remember reaction  Itching     There are no hospital problems to display for this patient.    Blood pressure 129/82, pulse 75, height 5' 6" (1.676 m), weight 114.3 kg (252 lb), SpO2 95%.    Subjective:  The patient is presenting status post lung biopsies.  He has been doing well he still smokes.      Objective:  His wounds have healed well.  Final pathology for follicular bronchiolitis.      Assessment & Plan:  Recovering well.  I discussed with him weight loss and he likes to be referred to bariatric surgery      Darron Hughes MD  10/23/2024    "

## 2024-10-25 LAB — PSYCHE PATHOLOGY RESULT: NORMAL

## 2024-11-18 ENCOUNTER — TELEPHONE (OUTPATIENT)
Dept: PULMONOLOGY | Facility: CLINIC | Age: 64
End: 2024-11-18
Payer: MEDICARE

## 2024-11-18 NOTE — TELEPHONE ENCOUNTER
[facility]  [address] [place] [id number]  [phone]      Pulmonary Surgical Risk Assessment      Patient Name:  Benitez Garcia  YOB: 1960  Medical Record Number:  [id number]      Pulmonary Diagnosis:  Mediastinal lymphadenopathy, presumed COPD and interstitial lung disease    Planned Surgery:  Left elbow open distal triceps rupture repair an olecranon spur excision and bursectomy under general anesthesia    Last Office Visit: October 21, 2024       Current medications:   Current Outpatient Medications   Medication Instructions    albuterol (PROVENTIL/VENTOLIN HFA) 90 mcg/actuation inhaler INHALE 2 PUFFS BY MOUTH EVERY 6 HOURS AS NEEDED FOR SHORTNESS OF BREATH    albuterol-ipratropium (DUO-NEB) 2.5 mg-0.5 mg/3 mL nebulizer solution 3 mLs, Every 6 hours PRN    atorvastatin (LIPITOR) 40 mg, Daily    baclofen (LIORESAL) 20 mg, 2 times daily    budesonide-glycopyr-formoterol (BREZTRI AEROSPHERE) 160-9-4.8 mcg/actuation HFAA 2 puffs, Inhalation, 2 times daily    cetirizine 10 mg, Nightly    cyanocobalamin 2,500 mcg, Daily    DULoxetine (CYMBALTA) 60 MG capsule No dose, route, or frequency recorded.    ergocalciferol (ERGOCALCIFEROL) 50,000 Units, Every 7 days    fluticasone furoate-vilanteroL (BREO ELLIPTA) 200-25 mcg/dose DsDv diskus inhaler 1 puff, Inhalation, Daily, Controller    glipiZIDE (GLUCOTROL) 10 mg, With breakfast    HYDROcodone-acetaminophen (NORCO)  mg per tablet 1 tablet, Every 6-8 hours PRN    HYDROcodone-acetaminophen (NORCO) 5-325 mg per tablet 1 tablet, Oral, Every 4 hours PRN    hydrOXYzine HCL (ATARAX) 25 mg, Nightly    ibuprofen (ADVIL,MOTRIN) 800 MG tablet No dose, route, or frequency recorded.    omeprazole (PRILOSEC) 40 mg, Daily    pregabalin (LYRICA) 75 mg, 2 times daily    terbinafine HCL (LAMISIL) 250 mg, Daily       Patient is stratified to be at low risk of pulmonary complications for the above planned surgery.  Proceed with surgery if  patient accepts the risks.    Additional comments:             Rakesh Dia MD  Pulmonary Critical Care Medicine

## 2024-12-03 ENCOUNTER — TELEPHONE (OUTPATIENT)
Dept: SURGERY | Facility: CLINIC | Age: 64
End: 2024-12-03
Payer: MEDICARE

## 2025-02-27 ENCOUNTER — HOSPITAL ENCOUNTER (OUTPATIENT)
Dept: RADIOLOGY | Facility: HOSPITAL | Age: 65
Discharge: HOME | End: 2025-02-27
Attending: HOSPITALIST
Payer: MEDICARE

## 2025-02-27 DIAGNOSIS — R91.1 SOLITARY PULMONARY NODULE: ICD-10-CM

## 2025-02-27 DIAGNOSIS — J44.9 CHRONIC OBSTRUCTIVE PULMONARY DISEASE, UNSPECIFIED COPD TYPE: ICD-10-CM

## 2025-02-27 DIAGNOSIS — J84.10 PULMONARY FIBROSIS: ICD-10-CM

## 2025-02-27 PROCEDURE — 71250 CT THORAX DX C-: CPT | Mod: TC

## (undated) DEVICE — SUT ETHBND XTRA 1 OS-8 30IN

## (undated) DEVICE — TIP SUCTION YANKAUER

## (undated) DEVICE — KIT SURGICAL TURNOVER

## (undated) DEVICE — DRESSING TELFA + BARR 4X6IN

## (undated) DEVICE — HEMOSTAT SURGICEL 2X14IN

## (undated) DEVICE — WRAP STERILIZATION 45X45 DISP

## (undated) DEVICE — GLOVE PROTEXIS HYDROGEL SZ6.5

## (undated) DEVICE — CLIP LIGATING HEMOCLP SMALL

## (undated) DEVICE — CORD LAP 10 DISP

## (undated) DEVICE — GLOVE PROTEXIS BLUE LATEX 7

## (undated) DEVICE — GLOVE PROTEXIS PI SYN SURG 7.5

## (undated) DEVICE — ELECTRODE RED DOT EKG

## (undated) DEVICE — SYR ONLY LUER LOCK 20CC

## (undated) DEVICE — Device

## (undated) DEVICE — TRAY CATH FOL SIL URIMTR 16FR

## (undated) DEVICE — GOWN SMARTSLEEVE AAMI LVL4 XXL

## (undated) DEVICE — MASK SURG LVL 3 EYESHIELD LOOP

## (undated) DEVICE — VALVE OLYMPUS SCOPE BIOPSY

## (undated) DEVICE — ADAPTER NUT NIPPLE

## (undated) DEVICE — JELLY LUBE FOIL PK STER 2.7GR

## (undated) DEVICE — IRRIGATOR HYDRO-SURG PLUS 5MM

## (undated) DEVICE — DRESSING TELFA N ADH 3X8IN

## (undated) DEVICE — GAUZE SPONGE 4X4 12PLY

## (undated) DEVICE — GLOVE PROTEXIS LTX MICRO 8

## (undated) DEVICE — CONTAINER SPECIMEN SCREW 4OZ

## (undated) DEVICE — GOWN ISOLATION OTH SMS YLW LG

## (undated) DEVICE — ELECTRODE PATIENT RETURN DISP

## (undated) DEVICE — TUBING AIRLIFE O2 VINYL 7FT

## (undated) DEVICE — TUBING SUC MEDI-VAC CONN 6FT

## (undated) DEVICE — NDL BIOPSY ASPIRATION 21G

## (undated) DEVICE — ATOMIZER NASAL DRUG DEL NO SYR

## (undated) DEVICE — KIT CANIST SUCTION 1200CC

## (undated) DEVICE — SUT VICRYL 3-0 8-18 PS-1

## (undated) DEVICE — PENCIL ELECSURG ROCKER 15FT

## (undated) DEVICE — CATH THORACIC 28FR ST

## (undated) DEVICE — SOL NACL IRR 1000ML BTL

## (undated) DEVICE — GLOVE BIOGEL 7.5

## (undated) DEVICE — ADAPTER BIOPSY VALVE

## (undated) DEVICE — GLOVE PROTEXIS NEOPRN SZ8

## (undated) DEVICE — KIT ANTIFOG W/SPONG & FLUID

## (undated) DEVICE — SYR DISP LL 5CC

## (undated) DEVICE — DRAPE STERI INCISE 23X23IN

## (undated) DEVICE — SYR ONLY LEUR TIP 30CC

## (undated) DEVICE — NEBULIZER MISY FAST 02 TUB 7FT

## (undated) DEVICE — SUT VICRYL 2-0 36 CT-1

## (undated) DEVICE — PAD DERMAPROX XL 22X14X.5IN

## (undated) DEVICE — VALVE OLYMPUS SCOPE SUCTION

## (undated) DEVICE — SPONGE LAP 18X18 PREWASHED

## (undated) DEVICE — LMA I-GEL  4.0 ADULT MD 50-90

## (undated) DEVICE — BOWL STERILE LARGE 32OZ

## (undated) DEVICE — MASK FACE W/ EARLP ASTM LVL 1

## (undated) DEVICE — BAG TISS RETRV MONARCH 10MM

## (undated) DEVICE — KIT CLN RIVITAL-OX ENDOSCP

## (undated) DEVICE — STOPCOCK DISCOFIX 3 WAY

## (undated) DEVICE — SUT MONOCRYL PLUS UD 3-0 27

## (undated) DEVICE — CONNECTOR TUBING OXYGEN

## (undated) DEVICE — SOL CLEARIFY VISUALIZATION LAP

## (undated) DEVICE — FILTER BACTERIA 001851

## (undated) DEVICE — TROCAR ENDOPATH EXCEL DILATING

## (undated) DEVICE — STAPLE TRI-STAPLE 2.0 45MM BLK

## (undated) DEVICE — OXISENSOR ADULT DIGIT N/S

## (undated) DEVICE — DRAPE INCISE IOBAN 2 13X13IN

## (undated) DEVICE — ADAPTER SWIVEL

## (undated) DEVICE — NDL 10CC 20GAX1 COMBO

## (undated) DEVICE — GLOVE PROTEXIS LTX MICRO  7

## (undated) DEVICE — SYR SLIP TIP 10ML SHIELD

## (undated) DEVICE — SOL IRR SOD CHL .9% POUR

## (undated) DEVICE — STAPLER ENDO GIA ULT UNIV 12MM

## (undated) DEVICE — CLIP LIGATING MEDIUM

## (undated) DEVICE — SOLIDIFIER BOTTLE 1500CC

## (undated) DEVICE — SPONGE GAUZE 16PLY 4X4

## (undated) DEVICE — DISSECTOR 5MM ENDOPATH

## (undated) DEVICE — DRESSING TRANS 4X4 TEGADERM

## (undated) DEVICE — DRAIN CHEST DRY SUCTION